# Patient Record
Sex: MALE | Race: WHITE | NOT HISPANIC OR LATINO | Employment: OTHER | URBAN - METROPOLITAN AREA
[De-identification: names, ages, dates, MRNs, and addresses within clinical notes are randomized per-mention and may not be internally consistent; named-entity substitution may affect disease eponyms.]

---

## 2023-03-31 DIAGNOSIS — B00.9 HERPES SIMPLEX: ICD-10-CM

## 2023-03-31 DIAGNOSIS — E78.00 HYPERCHOLESTEREMIA: Primary | ICD-10-CM

## 2023-03-31 RX ORDER — VALACYCLOVIR HYDROCHLORIDE 1 G/1
1000 TABLET, FILM COATED ORAL DAILY
Qty: 90 TABLET | Refills: 3 | Status: SHIPPED | OUTPATIENT
Start: 2023-03-31 | End: 2024-03-20 | Stop reason: SDUPTHER

## 2023-03-31 RX ORDER — VALACYCLOVIR HYDROCHLORIDE 1 G/1
1000 TABLET, FILM COATED ORAL DAILY
COMMUNITY
End: 2023-03-31 | Stop reason: SDUPTHER

## 2023-03-31 RX ORDER — ROSUVASTATIN CALCIUM 5 MG/1
5 TABLET, COATED ORAL NIGHTLY
COMMUNITY
End: 2023-03-31 | Stop reason: SDUPTHER

## 2023-03-31 RX ORDER — ROSUVASTATIN CALCIUM 5 MG/1
5 TABLET, COATED ORAL NIGHTLY
Qty: 90 TABLET | Refills: 3 | Status: SHIPPED | OUTPATIENT
Start: 2023-03-31 | End: 2024-05-07 | Stop reason: SDUPTHER

## 2023-05-05 DIAGNOSIS — K21.9 GASTROESOPHAGEAL REFLUX DISEASE, UNSPECIFIED WHETHER ESOPHAGITIS PRESENT: Primary | ICD-10-CM

## 2023-05-05 RX ORDER — ZINC GLUCONATE 13.3 MG
200 LOZENGE ORAL AS NEEDED
COMMUNITY

## 2023-05-05 RX ORDER — ESOMEPRAZOLE MAGNESIUM 40 MG/1
40 CAPSULE, DELAYED RELEASE ORAL
COMMUNITY
Start: 2023-02-10 | End: 2023-05-05 | Stop reason: SDUPTHER

## 2023-05-05 RX ORDER — ESOMEPRAZOLE MAGNESIUM 40 MG/1
40 CAPSULE, DELAYED RELEASE ORAL
Qty: 90 CAPSULE | Refills: 1 | Status: SHIPPED | OUTPATIENT
Start: 2023-05-05 | End: 2023-11-06 | Stop reason: SDUPTHER

## 2023-05-05 RX ORDER — CHLORHEXIDINE GLUCONATE 4 %
LIQUID (ML) TOPICAL
COMMUNITY

## 2023-06-15 PROBLEM — E78.5 HYPERLIPIDEMIA: Status: ACTIVE | Noted: 2023-06-15

## 2023-06-15 PROBLEM — B00.9 HERPES INFECTION: Status: RESOLVED | Noted: 2023-06-15 | Resolved: 2023-06-15

## 2023-06-15 NOTE — PROGRESS NOTES
Dimitris Schaeffer is a 63 y.o. male who presents Follow-up      HPI: Dimitris is here for follow up and to discuss potentially starting PrEP as he thinks about dating again since his  passed away last March.  He has been doing well for the most part.  He sold his condominium and bought a new house in the [facility].  Since his last visit with me in February he has been taking the Nexium for his chronic mucus production and throat hoarseness which he has had a excellent response to.  His symptoms resolved.  He noticed coming off of the Nexium in between prescriptions and his symptoms after 3 days returned.  He has never had an upper endoscopy.  His last colonoscopy was close to 10 years ago as well.    In addition he would like to discuss starting preexposure prophylaxis for HIV in preparation of starting to date again.      Patient Active Problem List   Diagnosis    Hyperlipidemia    Chronic GERD    Medication management     Past Medical History:   Diagnosis Date    Herpes infection         Past Surgical History:   Procedure Laterality Date    ADENOIDECTOMY      HERNIA REPAIR      Inguinal    TONSILLECTOMY          Social History     Tobacco Use    Smoking status: Never    Smokeless tobacco: Never         Current Outpatient Medications:     cimetidine (Tagamet) 200 mg tablet, Take 1 tablet (200 mg) by mouth if needed., Disp: , Rfl:     esomeprazole (NexIUM) 40 mg DR capsule, Take 1 capsule (40 mg) by mouth once daily in the morning. Take before meals., Disp: 90 capsule, Rfl: 1    glucosamine-chondroitin 500-400 mg tablet, Take 1 tablet by mouth 2 times a day., Disp: , Rfl:     melatonin 12 mg tablet, Take by mouth., Disp: , Rfl:     rosuvastatin (Crestor) 5 mg tablet, Take 1 tablet (5 mg) by mouth once daily at bedtime., Disp: 90 tablet, Rfl: 3    valACYclovir (Valtrex) 1 gram tablet, Take 1 tablet (1,000 mg) by mouth once daily., Disp: 90 tablet, Rfl: 3     No Known Allergies        /79 (BP  Location: Left arm, Patient Position: Sitting)   Pulse 91   Temp 37.6 °C (99.7 °F)   Wt 64 kg (141 lb)   SpO2 96%   BMI 23.11 kg/m²  Body mass index is 23.11 kg/m².     Physical Exam  Constitutional:       Appearance: Normal appearance.   Pulmonary:      Effort: Pulmonary effort is normal.      Breath sounds: Normal breath sounds.   Musculoskeletal:         General: Normal range of motion.   Neurological:      General: No focal deficit present.      Mental Status: He is alert.   Psychiatric:         Mood and Affect: Mood normal.         Judgment: Judgment normal.           Problem List Items Addressed This Visit       Chronic GERD - Primary    Relevant Orders    EGD    Medication management    Relevant Orders    HIV 1/2 Antigen/Antibody Screen with Reflex to Confirmation    RPR    Hepatitis panel, acute    Comprehensive metabolic panel    CBC and Auto Differential    Vitamin B12    Vitamin D 25-Hydroxy,Total    C. trachomatis / N. gonorrhoeae, DNA probe    XR DEXA bone density     Other Visit Diagnoses       Colon cancer screening        Relevant Orders    Colonoscopy             Assessment/Plan   We discussed the pros and cons of preexposure prophylaxis including the risk of bone loss in renal disease.  Patient handout was given to him.  We will get baseline labs and a bone density prior to starting.  For his chronic mucus production and chest discomfort relieved with Nexium but unable to come off we will set him up for an EGD.  He is due to have a screening colonoscopy at the same time.    Edilma Mason MD    TVT of 40 minutes with greater than 50% spent FTF in assessment/discussion and care coordination

## 2023-06-16 ENCOUNTER — LAB (OUTPATIENT)
Dept: LAB | Facility: LAB | Age: 63
End: 2023-06-16
Payer: COMMERCIAL

## 2023-06-16 ENCOUNTER — OFFICE VISIT (OUTPATIENT)
Dept: PRIMARY CARE | Facility: CLINIC | Age: 63
End: 2023-06-16
Payer: COMMERCIAL

## 2023-06-16 VITALS
TEMPERATURE: 99.7 F | SYSTOLIC BLOOD PRESSURE: 129 MMHG | HEART RATE: 91 BPM | WEIGHT: 141 LBS | DIASTOLIC BLOOD PRESSURE: 79 MMHG | OXYGEN SATURATION: 96 % | BODY MASS INDEX: 23.11 KG/M2

## 2023-06-16 DIAGNOSIS — K21.9 CHRONIC GERD: Primary | ICD-10-CM

## 2023-06-16 DIAGNOSIS — Z79.899 MEDICATION MANAGEMENT: ICD-10-CM

## 2023-06-16 DIAGNOSIS — Z12.11 COLON CANCER SCREENING: ICD-10-CM

## 2023-06-16 LAB
ALANINE AMINOTRANSFERASE (SGPT) (U/L) IN SER/PLAS: 28 U/L (ref 10–52)
ALBUMIN (G/DL) IN SER/PLAS: 4.9 G/DL (ref 3.4–5)
ALKALINE PHOSPHATASE (U/L) IN SER/PLAS: 60 U/L (ref 33–136)
ANION GAP IN SER/PLAS: 14 MMOL/L (ref 10–20)
ASPARTATE AMINOTRANSFERASE (SGOT) (U/L) IN SER/PLAS: 20 U/L (ref 9–39)
BASOPHILS (10*3/UL) IN BLOOD BY AUTOMATED COUNT: 0.03 X10E9/L (ref 0–0.1)
BASOPHILS/100 LEUKOCYTES IN BLOOD BY AUTOMATED COUNT: 0.4 % (ref 0–2)
BILIRUBIN TOTAL (MG/DL) IN SER/PLAS: 0.7 MG/DL (ref 0–1.2)
CALCIDIOL (25 OH VITAMIN D3) (NG/ML) IN SER/PLAS: 56 NG/ML
CALCIUM (MG/DL) IN SER/PLAS: 10 MG/DL (ref 8.6–10.6)
CARBON DIOXIDE, TOTAL (MMOL/L) IN SER/PLAS: 26 MMOL/L (ref 21–32)
CHLORIDE (MMOL/L) IN SER/PLAS: 102 MMOL/L (ref 98–107)
COBALAMIN (VITAMIN B12) (PG/ML) IN SER/PLAS: 481 PG/ML (ref 211–911)
CREATININE (MG/DL) IN SER/PLAS: 1.15 MG/DL (ref 0.5–1.3)
EOSINOPHILS (10*3/UL) IN BLOOD BY AUTOMATED COUNT: 0.21 X10E9/L (ref 0–0.7)
EOSINOPHILS/100 LEUKOCYTES IN BLOOD BY AUTOMATED COUNT: 3 % (ref 0–6)
ERYTHROCYTE DISTRIBUTION WIDTH (RATIO) BY AUTOMATED COUNT: 14.1 % (ref 11.5–14.5)
ERYTHROCYTE MEAN CORPUSCULAR HEMOGLOBIN CONCENTRATION (G/DL) BY AUTOMATED: 34 G/DL (ref 32–36)
ERYTHROCYTE MEAN CORPUSCULAR VOLUME (FL) BY AUTOMATED COUNT: 93 FL (ref 80–100)
ERYTHROCYTES (10*6/UL) IN BLOOD BY AUTOMATED COUNT: 5.23 X10E12/L (ref 4.5–5.9)
GFR MALE: 71 ML/MIN/1.73M2
GLUCOSE (MG/DL) IN SER/PLAS: 143 MG/DL (ref 74–99)
HEMATOCRIT (%) IN BLOOD BY AUTOMATED COUNT: 48.5 % (ref 41–52)
HEMOGLOBIN (G/DL) IN BLOOD: 16.5 G/DL (ref 13.5–17.5)
HEPATITIS A VIRUS IGM AB PRESENCE IN SER/PLAS BY IMMUNOASSAY: NONREACTIVE
HEPATITIS B VIRUS CORE IGM AB PRESENCE IN SER/PLAS BY IMMUNOASSY: NONREACTIVE
HEPATITIS B VIRUS SURFACE AG PRESENCE IN SERUM: NONREACTIVE
HEPATITIS C VIRUS AB PRESENCE IN SERUM: NONREACTIVE
HIV 1/ 2 AG/AB SCREEN: NONREACTIVE
IMMATURE GRANULOCYTES/100 LEUKOCYTES IN BLOOD BY AUTOMATED COUNT: 0.1 % (ref 0–0.9)
LEUKOCYTES (10*3/UL) IN BLOOD BY AUTOMATED COUNT: 7 X10E9/L (ref 4.4–11.3)
LYMPHOCYTES (10*3/UL) IN BLOOD BY AUTOMATED COUNT: 1.36 X10E9/L (ref 1.2–4.8)
LYMPHOCYTES/100 LEUKOCYTES IN BLOOD BY AUTOMATED COUNT: 19.5 % (ref 13–44)
MONOCYTES (10*3/UL) IN BLOOD BY AUTOMATED COUNT: 0.6 X10E9/L (ref 0.1–1)
MONOCYTES/100 LEUKOCYTES IN BLOOD BY AUTOMATED COUNT: 8.6 % (ref 2–10)
NEUTROPHILS (10*3/UL) IN BLOOD BY AUTOMATED COUNT: 4.77 X10E9/L (ref 1.2–7.7)
NEUTROPHILS/100 LEUKOCYTES IN BLOOD BY AUTOMATED COUNT: 68.4 % (ref 40–80)
NRBC (PER 100 WBCS) BY AUTOMATED COUNT: 0 /100 WBC (ref 0–0)
PLATELETS (10*3/UL) IN BLOOD AUTOMATED COUNT: 275 X10E9/L (ref 150–450)
POTASSIUM (MMOL/L) IN SER/PLAS: 3.8 MMOL/L (ref 3.5–5.3)
PROTEIN TOTAL: 7.6 G/DL (ref 6.4–8.2)
SODIUM (MMOL/L) IN SER/PLAS: 138 MMOL/L (ref 136–145)
UREA NITROGEN (MG/DL) IN SER/PLAS: 21 MG/DL (ref 6–23)

## 2023-06-16 PROCEDURE — 87591 N.GONORRHOEAE DNA AMP PROB: CPT

## 2023-06-16 PROCEDURE — 1036F TOBACCO NON-USER: CPT | Performed by: INTERNAL MEDICINE

## 2023-06-16 PROCEDURE — 85025 COMPLETE CBC W/AUTO DIFF WBC: CPT

## 2023-06-16 PROCEDURE — 80053 COMPREHEN METABOLIC PANEL: CPT

## 2023-06-16 PROCEDURE — 36415 COLL VENOUS BLD VENIPUNCTURE: CPT

## 2023-06-16 PROCEDURE — 87491 CHLMYD TRACH DNA AMP PROBE: CPT

## 2023-06-16 PROCEDURE — 99214 OFFICE O/P EST MOD 30 MIN: CPT | Performed by: INTERNAL MEDICINE

## 2023-06-16 PROCEDURE — 80074 ACUTE HEPATITIS PANEL: CPT

## 2023-06-16 PROCEDURE — 86592 SYPHILIS TEST NON-TREP QUAL: CPT

## 2023-06-16 PROCEDURE — 87389 HIV-1 AG W/HIV-1&-2 AB AG IA: CPT

## 2023-06-16 PROCEDURE — 82306 VITAMIN D 25 HYDROXY: CPT

## 2023-06-16 PROCEDURE — 82607 VITAMIN B-12: CPT

## 2023-06-16 RX ORDER — GLUCOSAMINE/CHONDRO SU A 500-400 MG
1 TABLET ORAL 2 TIMES DAILY
COMMUNITY

## 2023-06-17 DIAGNOSIS — Z91.89 AT RISK FOR HIV DUE TO HOMOSEXUAL CONTACT: ICD-10-CM

## 2023-06-17 LAB
CHLAMYDIA TRACH., AMPLIFIED: NEGATIVE
N. GONORRHEA, AMPLIFIED: NEGATIVE
RPR MONITORING: NONREACTIVE

## 2023-06-17 RX ORDER — EMTRICITABINE AND TENOFOVIR DISOPROXIL FUMARATE 200; 300 MG/1; MG/1
1 TABLET, FILM COATED ORAL DAILY
Qty: 90 TABLET | Refills: 3 | Status: SHIPPED | OUTPATIENT
Start: 2023-06-17 | End: 2024-06-10 | Stop reason: SDUPTHER

## 2023-07-31 ENCOUNTER — HOSPITAL ENCOUNTER (OUTPATIENT)
Dept: DATA CONVERSION | Facility: HOSPITAL | Age: 63
End: 2023-07-31
Attending: INTERNAL MEDICINE | Admitting: INTERNAL MEDICINE
Payer: COMMERCIAL

## 2023-07-31 DIAGNOSIS — K29.70 GASTRITIS, UNSPECIFIED, WITHOUT BLEEDING: ICD-10-CM

## 2023-07-31 DIAGNOSIS — Z12.11 ENCOUNTER FOR SCREENING FOR MALIGNANT NEOPLASM OF COLON: ICD-10-CM

## 2023-07-31 DIAGNOSIS — R12 HEARTBURN: ICD-10-CM

## 2023-07-31 DIAGNOSIS — K21.9 GASTRO-ESOPHAGEAL REFLUX DISEASE WITHOUT ESOPHAGITIS: ICD-10-CM

## 2023-07-31 DIAGNOSIS — K64.4 RESIDUAL HEMORRHOIDAL SKIN TAGS: ICD-10-CM

## 2023-08-03 LAB
COMPLETE PATHOLOGY REPORT: NORMAL
CONVERTED ADDENDUM DIAGNOSIS 2: NORMAL
CONVERTED CLINICAL DIAGNOSIS-HISTORY: NORMAL
CONVERTED FINAL DIAGNOSIS: NORMAL
CONVERTED FINAL REPORT PDF LINK TO COPY AND PASTE: NORMAL
CONVERTED GROSS DESCRIPTION: NORMAL

## 2023-08-07 ENCOUNTER — TELEPHONE (OUTPATIENT)
Dept: PRIMARY CARE | Facility: CLINIC | Age: 63
End: 2023-08-07
Payer: COMMERCIAL

## 2023-08-07 NOTE — TELEPHONE ENCOUNTER
Pt is calling in regards to colonoscopy and endoscopy and would like to touch base about the results - KIM

## 2023-08-07 NOTE — TELEPHONE ENCOUNTER
Discussed results with Adis.  His prep was fair to poor on the colonoscopy however no specimens found.  Will repeat Colonoscopy in 5 years. His EGD had reactive gastropathy and active gastritis. He is on Nexium 40 mg daily and experiencing some relapse of his symptoms. I advised that her double down on lifestyle changes and to increase his Nexium to bid for the next couple of weeks.  His H. Pylori is still pending. He is planning on travelling to [place] to visit his late partners family and will plan on updating his covid booster prior to travel.

## 2023-11-06 DIAGNOSIS — K21.9 GASTROESOPHAGEAL REFLUX DISEASE, UNSPECIFIED WHETHER ESOPHAGITIS PRESENT: ICD-10-CM

## 2023-11-06 RX ORDER — ESOMEPRAZOLE MAGNESIUM 40 MG/1
40 CAPSULE, DELAYED RELEASE ORAL
Qty: 90 CAPSULE | Refills: 3 | Status: SHIPPED | OUTPATIENT
Start: 2023-11-06

## 2023-12-08 ENCOUNTER — OFFICE VISIT (OUTPATIENT)
Dept: DERMATOLOGY | Facility: CLINIC | Age: 63
End: 2023-12-08
Payer: COMMERCIAL

## 2023-12-08 DIAGNOSIS — L57.0 ACTINIC KERATOSIS: ICD-10-CM

## 2023-12-08 DIAGNOSIS — B35.3 TINEA PEDIS OF RIGHT FOOT: ICD-10-CM

## 2023-12-08 DIAGNOSIS — D48.5 NEOPLASM OF UNCERTAIN BEHAVIOR OF SKIN: ICD-10-CM

## 2023-12-08 DIAGNOSIS — Z12.83 SCREENING EXAM FOR SKIN CANCER: Primary | ICD-10-CM

## 2023-12-08 DIAGNOSIS — L81.4 LENTIGO: ICD-10-CM

## 2023-12-08 DIAGNOSIS — D22.5 MELANOCYTIC NEVUS OF TRUNK: ICD-10-CM

## 2023-12-08 DIAGNOSIS — D18.01 HEMANGIOMA OF SKIN: ICD-10-CM

## 2023-12-08 DIAGNOSIS — D04.61 SQUAMOUS CELL CARCINOMA IN SITU (SCCIS) OF DORSUM OF RIGHT HAND: ICD-10-CM

## 2023-12-08 DIAGNOSIS — L57.8 DIFFUSE PHOTODAMAGE OF SKIN: ICD-10-CM

## 2023-12-08 PROCEDURE — 17004 DESTROY PREMAL LESIONS 15/>: CPT | Performed by: DERMATOLOGY

## 2023-12-08 PROCEDURE — 1036F TOBACCO NON-USER: CPT | Performed by: DERMATOLOGY

## 2023-12-08 PROCEDURE — 99204 OFFICE O/P NEW MOD 45 MIN: CPT | Performed by: DERMATOLOGY

## 2023-12-08 PROCEDURE — 88305 TISSUE EXAM BY PATHOLOGIST: CPT | Mod: TC,DER | Performed by: DERMATOLOGY

## 2023-12-08 PROCEDURE — 88305 TISSUE EXAM BY PATHOLOGIST: CPT | Performed by: DERMATOLOGY

## 2023-12-08 PROCEDURE — 11306 SHAVE SKIN LESION 0.6-1.0 CM: CPT

## 2023-12-08 RX ORDER — KETOCONAZOLE 20 MG/G
CREAM TOPICAL 2 TIMES DAILY
Qty: 60 G | Refills: 11 | Status: SHIPPED | OUTPATIENT
Start: 2023-12-08

## 2023-12-08 ASSESSMENT — DERMATOLOGY PATIENT ASSESSMENT
DO YOU USE A TANNING BED: NO
DO YOU HAVE ANY NEW OR CHANGING LESIONS: NO
DO YOU USE SUNSCREEN: OCCASIONALLY

## 2023-12-08 ASSESSMENT — DERMATOLOGY QUALITY OF LIFE (QOL) ASSESSMENT: ARE THERE EXCLUSIONS OR EXCEPTIONS FOR THE QUALITY OF LIFE ASSESSMENT: NO

## 2023-12-08 ASSESSMENT — ITCH NUMERIC RATING SCALE: HOW SEVERE IS YOUR ITCHING?: 0

## 2023-12-08 NOTE — PROGRESS NOTES
Subjective     Dimitris Schaeffer is a 63 y.o. male who presents for the following: Skin Check.  He denies any changes in any of the brown spots on his skin recently, including in size, shape, or color, and he states they are all asymptomatic with no associated bleeding, itching, or burning.  He notes 2 scaly bumps, 1 on each hand, which have been present for several months and have increased in size and have not resolved.    Review of Systems:  No other skin or systemic complaints other than what is documented elsewhere in the note.    The following portions of the chart were reviewed this encounter and updated as appropriate:       Skin Cancer History  No skin cancer on file.    Specialty Problems    None      Past Dermatologic / Past Relevant Medical History:    No history of atypical nevi or skin cancer    Family History:    No known family history of skin cancer    Social History:    The patient states he is originally from [place], but then worked as a  in the [employer] in [facility] and now lives in [place]; his sister is a patient in our office as well as was his partner, Gustavo, but he recently passed away    Allergies:  Patient has no known allergies.    Current Medications / CAM's:    Current Outpatient Medications:     cimetidine (Tagamet) 200 mg tablet, Take 1 tablet (200 mg) by mouth if needed., Disp: , Rfl:     emtricitabine-tenofovir, TDF, (Truvada) 200-300 mg tablet, Take 1 tablet by mouth once daily., Disp: 90 tablet, Rfl: 3    esomeprazole (NexIUM) 40 mg DR capsule, Take 1 capsule (40 mg) by mouth once daily in the morning. Take before meals., Disp: 90 capsule, Rfl: 3    glucosamine-chondroitin 500-400 mg tablet, Take 1 tablet by mouth 2 times a day., Disp: , Rfl:     melatonin 12 mg tablet, Take by mouth., Disp: , Rfl:     rosuvastatin (Crestor) 5 mg tablet, Take 1 tablet (5 mg) by mouth once daily at bedtime., Disp: 90 tablet, Rfl: 3    valACYclovir (Valtrex) 1 gram tablet,  Take 1 tablet (1,000 mg) by mouth once daily., Disp: 90 tablet, Rfl: 3    ketoconazole (NIZOral) 2 % cream, Apply topically 2 times a day. To affected areas of feet for 3-4 weeks; repeat as needed for flares, Disp: 60 g, Rfl: 11     Objective   Well appearing patient in no apparent distress; mood and affect are within normal limits.    A full examination was performed including scalp, face, eyes, ears, nose, lips, neck, chest, axillae, abdomen, back, bilateral upper extremities, and bilateral lower extremities. All findings within normal limits unless otherwise noted below.    Assessment/Plan   1. Screening exam for skin cancer    Related Procedures  Follow Up In Dermatology - Established Patient    2. Neoplasm of uncertain behavior of skin (2)  Left medial distal dorsal hand  6 mm erythematous, scaly, hyperkeratotic papule          Shave removal    Lesion length (cm):  0.6  Margin per side (cm):  0  Lesion diameter (cm):  0.6  Informed consent: discussed and consent obtained    Timeout: patient name, date of birth, surgical site, and procedure verified    Procedure prep:  Patient was prepped and draped  Anesthesia: the lesion was anesthetized in a standard fashion    Anesthetic:  1% lidocaine w/ epinephrine 1-100,000 local infiltration  Instrument used: DermaBlade and flexible razor blade    Hemostasis achieved with: aluminum chloride    Outcome: patient tolerated procedure well    Post-procedure details: sterile dressing applied and wound care instructions given    Dressing type: bandage and petrolatum      Staff Communication: Dermatology Local Anesthesia: 1 % Lidocaine / Epinephrine - Amount: 0.5 cc    Specimen 1 - Dermatopathology- DERM LAB  Differential Diagnosis: SCCis vs HAK  Check Margins Yes/No?:  yes  Comments:    Dermpath Lab: Routine Histopathology (formalin-fixed tissue)    Right proximal dorsal hand  7 mm erythematous, scaly, hyperkeratotic papule          Shave removal    Lesion length (cm):   0.7  Margin per side (cm):  0  Lesion diameter (cm):  0.7  Informed consent: discussed and consent obtained    Timeout: patient name, date of birth, surgical site, and procedure verified    Procedure prep:  Patient was prepped and draped  Anesthesia: the lesion was anesthetized in a standard fashion    Anesthetic:  1% lidocaine w/ epinephrine 1-100,000 local infiltration  Instrument used: DermaBlade and flexible razor blade    Hemostasis achieved with: aluminum chloride    Outcome: patient tolerated procedure well    Post-procedure details: sterile dressing applied and wound care instructions given    Dressing type: bandage and petrolatum      Staff Communication: Dermatology Local Anesthesia: 1 % Lidocaine / Epinephrine - Amount: 0.5 cc    Specimen 2 - Dermatopathology- DERM LAB  Differential Diagnosis: SCCis vs HAK  Check Margins Yes/No?:  yes  Comments:    Dermpath Lab: Routine Histopathology (formalin-fixed tissue)    3. Actinic keratosis (16)  Forehead and Scalp (16)  Scattered on the patient's face, mainly his forehead, and frontal scalp, there are multiple erythematous, gritty, scaly macules     Actinic Keratoses - scattered on face and frontal scalp.  The pre-cancerous nature of these lesions and treatment options were discussed with the patient today.  At this time, we recommend treatment with liquid nitrogen cryotherapy.  The patient expressed understanding, is in agreement with this plan, and wishes to proceed with cryotherapy today.    Destr of lesion - Forehead and Scalp  Complexity: simple    Destruction method: cryotherapy    Informed consent: discussed and consent obtained    Lesion destroyed using liquid nitrogen: Yes    Cryotherapy cycles:  1  Outcome: patient tolerated procedure well with no complications    Post-procedure details: wound care instructions given      4. Melanocytic nevus of trunk  Scattered on the patient's face, neck, trunk, and extremities, there are multiple small, round- to  oval-shaped, brown-pigmented and pink-colored, symmetric, uniform-appearing macules and dome-shaped papules    Clinically benign- to slightly atypical-appearing nevi - the clinically benign- to slightly atypical-appearing nature of the patient's nevi was discussed with the patient today.  None of the patient's nevi meet threshold for biopsy today.  We emphasized the importance of performing monthly self-skin exams using the ABCDs of monitoring moles, which were reviewed with the patient today and an informational hand-out provided.  We also emphasized the importance of sun avoidance and sun protection with daily sunscreen use.  The patient expressed understanding and is in agreement with this plan.    5. Lentigo  Multiple tan- to light brown-colored, round- to oval-shaped, symmetric and uniform-appearing macules and small patches consistent with lentigines scattered in sun-exposed areas.    Solar Lentigines and photodamage.  The clinically benign-appearing nature of these lesions and their relation to chronic sun exposure were discussed with the patient today and reassurance provided.  None of these lesions meet threshold for biopsy today, and thus no treatment is medically indicated for these lesions at this time.  The signs and symptoms of skin cancer were reviewed and the patient was advised to practice sun protection and sun avoidance, use daily sunscreen, and perform regular self skin exams.  The patient was instructed to monitor these lesions for any changes, such as in size, shape, or color, or associated symptoms and to call our office to schedule a return visit for re-evaluation if any such changes or symptoms are noticed in the future.  The patient expressed understanding and is in agreement with this plan.    6. Hemangioma of skin  Scattered on the patient's face, neck, trunk, and extremities, there are multiple small, round, cherry red- to purplish-colored, symmetric, uniform, vascular-appearing macules  and papules    Cherry Angiomas - the benign nature of these vascular lesions was discussed with the patient today and reassurance provided.  No treatment is medically indicated for these lesions at this time.    7. Tinea pedis of right foot  Bilateral feet  On the patient's bilateral feet, there is moist scaling with maceration and fissures in the lateral webspaces and erythematous, scaly, thin plaques in a moccasin-type distribution on the soles and heels.    Tinea Pedis - bilateral feet.  The fungal nature of this condition and treatment options were discussed extensively with the patient today.  At this time, we recommend topical anti-fungal therapy with Econazole 1% cream, which the patient was instructed to apply twice daily to the affected areas of the feet for the next 3-4 weeks; the patient may repeat treatment in a similar fashion as needed for future flares.  The risks, benefits, and side effects of this medication were discussed.  The patient expressed understanding and is in agreement with this plan.    ketoconazole (NIZOral) 2 % cream - Bilateral feet  Apply topically 2 times a day. To affected areas of feet for 3-4 weeks; repeat as needed for flares    8. Diffuse photodamage of skin  Diffuse photodamage with actinic changes with telangiectasia and mottled pigmentation in sun-exposed areas.    Photodamage.  The signs and symptoms of skin cancer were reviewed and the patient was advised to practice sun protection and sun avoidance, use daily sunscreen, and perform regular self skin exams.  Sun protection was discussed, including avoiding the mid-day sun, wearing a sunscreen with SPF at least 50, and stressing the need for reapplication of sunscreen and applying more than they think they need.      I saw and evaluated the patient. I personally obtained the key and critical portions of the history and physical exam or was physically present for key and critical portions performed by the resident/fellow. I  reviewed the resident/fellow's documentation and discussed the patient with the resident/fellow. I agree with the resident/fellow's medical decision making as documented in the note.    Linden Bey MD

## 2023-12-12 LAB
LABORATORY COMMENT REPORT: NORMAL
PATH REPORT.FINAL DX SPEC: NORMAL
PATH REPORT.GROSS SPEC: NORMAL
PATH REPORT.MICROSCOPIC SPEC OTHER STN: NORMAL
PATH REPORT.RELEVANT HX SPEC: NORMAL
PATH REPORT.TOTAL CANCER: NORMAL

## 2024-02-15 ENCOUNTER — OFFICE VISIT (OUTPATIENT)
Dept: DERMATOLOGY | Facility: CLINIC | Age: 64
End: 2024-02-15
Payer: COMMERCIAL

## 2024-02-15 DIAGNOSIS — L57.0 ACTINIC KERATOSIS: ICD-10-CM

## 2024-02-15 DIAGNOSIS — L82.1 SEBORRHEIC KERATOSIS: ICD-10-CM

## 2024-02-15 DIAGNOSIS — L81.4 LENTIGO: ICD-10-CM

## 2024-02-15 DIAGNOSIS — L57.8 DIFFUSE PHOTODAMAGE OF SKIN: ICD-10-CM

## 2024-02-15 DIAGNOSIS — C44.92 SQUAMOUS CELL CARCINOMA OF SKIN: Primary | ICD-10-CM

## 2024-02-15 PROCEDURE — 99213 OFFICE O/P EST LOW 20 MIN: CPT | Performed by: DERMATOLOGY

## 2024-02-15 PROCEDURE — 1036F TOBACCO NON-USER: CPT | Performed by: DERMATOLOGY

## 2024-02-15 PROCEDURE — 17004 DESTROY PREMAL LESIONS 15/>: CPT | Performed by: DERMATOLOGY

## 2024-02-15 ASSESSMENT — DERMATOLOGY QUALITY OF LIFE (QOL) ASSESSMENT: ARE THERE EXCLUSIONS OR EXCEPTIONS FOR THE QUALITY OF LIFE ASSESSMENT: NO

## 2024-02-15 ASSESSMENT — DERMATOLOGY PATIENT ASSESSMENT
DO YOU USE A TANNING BED: NO
DO YOU USE SUNSCREEN: OCCASIONALLY
DO YOU HAVE ANY NEW OR CHANGING LESIONS: NO
ARE YOU AN ORGAN TRANSPLANT RECIPIENT: NO

## 2024-02-15 ASSESSMENT — ITCH NUMERIC RATING SCALE: HOW SEVERE IS YOUR ITCHING?: 0

## 2024-02-16 NOTE — PROGRESS NOTES
Subjective     Dimitris Schaeffer is a 63 y.o. male who presents for the following: Discuss recent biopsy results and treatment options.  Biopsy of 2 suspicious lesions performed at his initial visit in our office on 12/8/23 revealed an actinic keratosis on his left medial distal dorsal hand and a squamous cell carcinoma in situ on his right proximal dorsal hand.    Today, the patient states the biopsy sites healed well.  Since his last visit, he states he has noticed a red, scaly, rough spot on his left cheek.  He also notes several dry, scaly spots on his hands.  They are asymptomatic with no associated bleeding, itching, or burning.      Review of Systems:  No other skin or systemic complaints other than what is documented elsewhere in the note.    The following portions of the chart were reviewed this encounter and updated as appropriate:       Skin Cancer History  Biopsy Date Type Location Status   12/8/23 SCC in Situ Right proximal dorsal hand Refer Mohs/Surgeon     Specialty Problems    None      Past Dermatologic / Past Relevant Medical History:    - recent biopsy-proven SCC in situ on right proximal dorsal hand diagnosed at initial visit on 12/8/23  - AKs  - no h/o melanoma    Family History:    No known family history of skin cancer    Social History:    The patient states he is originally from [place], but then worked as a  in the [employer] in [facility] and now lives in [place]; his sister is a patient in our office as well as was his partner, Gustavo, but he recently passed away    Allergies:  Patient has no known allergies.    Current Medications / CAM's:    Current Outpatient Medications:     cimetidine (Tagamet) 200 mg tablet, Take 1 tablet (200 mg) by mouth if needed., Disp: , Rfl:     emtricitabine-tenofovir, TDF, (Truvada) 200-300 mg tablet, Take 1 tablet by mouth once daily., Disp: 90 tablet, Rfl: 3    esomeprazole (NexIUM) 40 mg DR capsule, Take 1 capsule (40 mg) by mouth once  daily in the morning. Take before meals., Disp: 90 capsule, Rfl: 3    glucosamine-chondroitin 500-400 mg tablet, Take 1 tablet by mouth 2 times a day., Disp: , Rfl:     ketoconazole (NIZOral) 2 % cream, Apply topically 2 times a day. To affected areas of feet for 3-4 weeks; repeat as needed for flares, Disp: 60 g, Rfl: 11    melatonin 12 mg tablet, Take by mouth., Disp: , Rfl:     rosuvastatin (Crestor) 5 mg tablet, Take 1 tablet (5 mg) by mouth once daily at bedtime., Disp: 90 tablet, Rfl: 3    valACYclovir (Valtrex) 1 gram tablet, Take 1 tablet (1,000 mg) by mouth once daily., Disp: 90 tablet, Rfl: 3     Objective   Well appearing patient in no apparent distress; mood and affect are within normal limits.    A skin examination was performed including: Face, neck, and bilateral hands. All findings within normal limits unless otherwise noted below.    Assessment/Plan   1. Squamous cell carcinoma of skin  Right Hand - Anterior  Pink, well-healed scar at his recent biopsy site    Biopsy-proven squamous cell carcinoma in situ - right proximal dorsal hand; diagnosed at initial visit on 12/8/23 and pending definitive treatment.  The malignant nature of SCC in situ, the need for further definitive treatment of this lesion, and management options were discussed extensively with the patient in the office today.  At this time, I again recommend referral to our Dermatologic surgery unit for definitive treatment of this SCC in situ, likely with Mohs surgery.  The patient expressed understanding, is in agreement with this plan, and states he already scheduled his Mohs surgery to be performed in our office by Dr. Kelyl on 3/15/24.    2. Actinic keratosis (17)  Left Hand - Anterior (17)  On the patient's left medial distal dorsal hand, there is a pink, well-healed scar at the recent biopsy site with a surrounding rim of erythema, grittiness, and scale.  On her left medial cheek and scattered on her bilateral dorsal hands, there are  multiple erythematous, gritty, scaly macules.    Biopsy-proven Actinic Keratosis and additional AKs -left medial distal dorsal hand, present on the deep and peripheral margins, and left medial cheek and scattered on bilateral dorsal hands, respectively.  The pre-cancerous nature of these lesions and treatment options were discussed with the patient today.  At this time, I recommend treatment with liquid nitrogen cryotherapy.  The patient expressed understanding, is in agreement with this plan, and wishes to proceed with cryotherapy today.    Destr of lesion - Left Hand - Anterior  Complexity: simple    Destruction method: cryotherapy    Informed consent: discussed and consent obtained    Lesion destroyed using liquid nitrogen: Yes    Cryotherapy cycles:  1  Outcome: patient tolerated procedure well with no complications    Post-procedure details: wound care instructions given      Related Procedures  Follow Up In Dermatology    3. Seborrheic keratosis  Scattered on the patient's face, neck, and bilateral hands, there are several tan- to light brown-colored, hyperkeratotic, stuck-on appearing papules of varying size and shape    Seborrheic Keratoses - the benign nature of these lesions was discussed with the patient today and reassurance provided.  No treatment is medically indicated for these lesions at this time.    4. Lentigo  Photodistributed  Multiple tan- to light brown-colored, round- to oval-shaped, symmetric and uniform-appearing macules and small patches consistent with lentigines scattered in sun-exposed areas.    Solar Lentigines and photodamage.  The clinically benign-appearing nature of these lesions and their relation to chronic sun exposure were discussed with the patient today and reassurance provided.  None of these lesions meet threshold for biopsy today, and thus no treatment is medically indicated for these lesions at this time.  The signs and symptoms of skin cancer were reviewed and the patient  was advised to practice sun protection and sun avoidance, use daily sunscreen, and perform regular self skin exams.  The patient was instructed to monitor these lesions for any changes, such as in size, shape, or color, or associated symptoms and to call our office to schedule a return visit for re-evaluation if any such changes or symptoms are noticed in the future.  The patient expressed understanding and is in agreement with this plan.    5. Diffuse photodamage of skin  Photodistributed  Diffuse photodamage with actinic changes with telangiectasia and mottled pigmentation in sun-exposed areas.    History of squamous cell carcinoma in situ and actinic keratoses and photodamage.  The signs and symptoms of skin cancer were reviewed and the patient was advised to practice sun protection and sun avoidance, use daily sunscreen, and perform regular self skin exams.  I will have the patient return to my office in 4 to 6 months from the date of his last visit for routine follow-up and skin exam, and the patient was instructed to call our office should the patient notice any new, changing, symptomatic, or otherwise concerning skin lesions before then.  The patient expressed understanding and is in agreement with this plan.

## 2024-03-15 ENCOUNTER — OFFICE VISIT (OUTPATIENT)
Dept: DERMATOLOGY | Facility: CLINIC | Age: 64
End: 2024-03-15
Payer: COMMERCIAL

## 2024-03-15 DIAGNOSIS — C44.622 SQUAMOUS CELL CANCER OF SKIN OF RIGHT HAND: Primary | ICD-10-CM

## 2024-03-15 PROCEDURE — 99214 OFFICE O/P EST MOD 30 MIN: CPT | Performed by: DERMATOLOGY

## 2024-03-15 PROCEDURE — 1036F TOBACCO NON-USER: CPT | Performed by: DERMATOLOGY

## 2024-03-15 PROCEDURE — 17311 MOHS 1 STAGE H/N/HF/G: CPT | Performed by: DERMATOLOGY

## 2024-03-15 NOTE — PROGRESS NOTES
Office Visit Note  Date: 3/15/2024  Surgeon:  Danica Kelly MD  Office Location: [address]  [facility] [address]  Dept: [phone]  Dept Fax: [phone]  Referring Provider: Linden Bey MD  [address] [place] [address]   Darren Christine [place], [facility] [address]  [place] [address]    Kwadwo Schaeffer is a 63 y.o. male who presents for the following: MOHS Surgery    According to the patient, the lesion has been present for approximately 6 months at the time of diagnosis.  The lesion is not causing symptoms.  The lesion has not been treated previously.    The patient does not have a pacemaker / defibrillator.  The patient does not have a heart valve / joint replacement.    The patient is not on blood thinners.  The patient does not have a history of hepatitis B or C.  The patient does not have a history of HIV.  The patient does not have a history of immunosuppression (e.g. organ transplantation, malignancy, medications)    Review of Systems:  No other skin or systemic complaints other than what is documented elsewhere in the note.    MEDICAL HISTORY: clinically relevant history including significant past medical history, medications and allergies was reviewed and documented in Epic.    Objective   Well appearing patient in no apparent distress; mood and affect are within normal limits.  Vital signs: See record.  Noted on the Right Dorsal Hand  Is a 1.0 x 1.0 cm scar    The patient confirmed the identified site.    Discussion:  The nature of the diagnosis was explained. The lesion is a skin cancer.  It has a risk of local growth and distant spread. The condition is associated with sun exposure.  Warning signs of non-melanoma skin cancer discussed. Patient was instructed to perform monthly self skin examination.  We recommended that the patient have regular full skin exams given an increased risk of subsequent skin cancers. The patient was instructed to use sun  protective behaviors including use of broad spectrum sunscreens and sun protective clothing to reduce risk of skin cancers.      Risks, benefits, side effects of Mohs surgery were discussed with patient and the patient voiced understanding.  It was explained that even though the cure rate of Mohs is very high it is not 100%. Risks of surgery including but not limited to bleeding, infection, numbness, nerve damage, and scar were reviewed.  Discussion included wound care requirements, activity restrictions, likely scar outcome and time to heal.     After Mohs surgery, the defect may need to be repaired surgically and the scar may be longer than the original lesion.  Reconstruction options, risks, and benefits were reviewed including second intention healing, linear repair (4-1 ratio was explained), local flaps, skin grafts, cartilage grafts and interpolation flaps (the need for multiple surgeries was explained). Possible outcomes were reviewed including likely scar appearance, failure of flap survival, infection, bleeding and the need for revision surgery.     The pathology was reviewed, the photograph was reviewed, and the referring physician's note was reviewed.    Patient elected for Mohs surgery.

## 2024-03-15 NOTE — PROGRESS NOTES
Mohs Surgery Operative Note    Date of Surgery:  3/15/2024  Surgeon:  Danica Kelly MD  Office Location: [address]   [facility] [address]  [facility] [address]  Dept: [phone]  Dept Fax: [phone]  Referring Provider: Linden Bey MD  [address] Darren Arias, [address] [address]      Assessment/Plan   Pre-procedure:   Obtained informed consent: written from patient  The surgical site was identified and confirmed with the patient.     Intra-operative:   Audible time out called at : 8:25 AM 03/15/24  by: Ciarra Mason LPN   Verified patient name, birthdate, site, specimen bottle label & requisition.    The planned procedure(s) was again reviewed with the patient. The risks of bleeding, infection, nerve damage and scarring were reviewed. Written authorization was obtained. The patient identity, surgical site, and planned procedure(s) were verified. The provider acted as both surgeon and pathologist.     Squamous cell carcinoma of skin  Right Dorsal Hand  Mohs surgery  Consent obtained: written    Universal Protocol:  Procedure explained and questions answered to patient or proxy's satisfaction: Yes    Test results available and properly labeled: Yes    Pathology report reviewed: Yes    External notes reviewed: Yes    Photo or diagram used for site identification: Yes    Site/side marked: Yes    Slide independently reviewed by Mohs surgeon: Yes    Immediately prior to procedure a time out was called: Yes    Patient identity confirmed: verbally with patient  Preparation: Patient was prepped and draped in usual sterile fashion      Anticoagulation:  Is the patient taking prescription anticoagulant and/or aspirin prescribed/recommended by a physician? No    Was the anticoagulation regimen changed prior to Mohs? No      Anesthesia:  Anesthesia method: local infiltration  Local anesthetic: lidocaine 2% WITH epi    Procedure Details:  Biopsy accession  number: [id number]  Date of biopsy: 12/8/2023  Pre-Op diagnosis: squamous cell carcinoma  SCC subtype: in situ  Surgery side: right  Surgical site (from skin exam): Right Dorsal Hand  Pre-operative length (cm): 1  Pre-operative width (cm): 1  Indications for Mohs surgery: ill-defined borders  Previously treated? No      Micrographic Surgery Details:  Post-operative length (cm): 1  Post-operative width (cm): 1  Number of Mohs stages: 1    Stage 1     Comments: The patient was brought into the operating room and placed in the procedure chair in the appropriate position.  The area positive by previous biopsy was identified and confirmed with the patient. The area of clinically obvious tumor was debulked using a curette and/or scalpel as needed. An incision was made following the Mohs approach through the skin. The specimen was taken to the lab, divided into 1 piece(s) and appropriately chromacoded and processed.  Tumor features identified on Mohs section: no tumor identified  Depth of defect: subcutaneous fat    Patient tolerance of procedure: tolerated well, no immediate complications    Reconstruction:  Was the defect reconstructed?: No    Hemostasis achieved with: electrodesiccation  Outcome: patient tolerated procedure well with no complications    Post-procedure details: sterile dressing applied and wound care instructions given    Dressing type: Gelfoam, Telfa pad, pressure dressing and bandage    Additional details:  Repair: After a discussion with the patient regarding the options for wound closure, a decision was made to proceed with second intention healing.  Dressing F/U: Surgifoam was placed in the wound. A pressure dressing was placed to help stabilize the wound and to minimize the risk of postoperative bleeding. Wound care was discussed, and the patient was given written post-operative wound care instructions.             Wound care was discussed, and the patient was given written post-operative wound care  instructions.      The patient will follow up with Danica Kelly MD as needed for any post operative problems or concerns, and will follow up with their primary dermatologist as scheduled.

## 2024-03-20 DIAGNOSIS — B00.9 HERPES SIMPLEX: ICD-10-CM

## 2024-03-20 RX ORDER — VALACYCLOVIR HYDROCHLORIDE 1 G/1
1000 TABLET, FILM COATED ORAL DAILY
Qty: 90 TABLET | Refills: 1 | Status: SHIPPED | OUTPATIENT
Start: 2024-03-20

## 2024-04-08 ENCOUNTER — HOSPITAL ENCOUNTER (OUTPATIENT)
Dept: RADIOLOGY | Facility: HOSPITAL | Age: 64
Discharge: HOME | End: 2024-04-08
Payer: COMMERCIAL

## 2024-04-08 ENCOUNTER — OFFICE VISIT (OUTPATIENT)
Dept: ORTHOPEDIC SURGERY | Facility: HOSPITAL | Age: 64
End: 2024-04-08
Payer: COMMERCIAL

## 2024-04-08 VITALS — WEIGHT: 148 LBS | BODY MASS INDEX: 25.27 KG/M2 | HEIGHT: 64 IN

## 2024-04-08 DIAGNOSIS — M25.561 ACUTE PAIN OF RIGHT KNEE: ICD-10-CM

## 2024-04-08 DIAGNOSIS — M25.561 RIGHT KNEE PAIN, UNSPECIFIED CHRONICITY: ICD-10-CM

## 2024-04-08 DIAGNOSIS — M76.899 PES ANSERINUS TENDINITIS AND BURSITIS: Primary | ICD-10-CM

## 2024-04-08 PROCEDURE — E0114 CRUTCH UNDERARM PAIR NO WOOD: HCPCS | Performed by: PHYSICIAN ASSISTANT

## 2024-04-08 PROCEDURE — 73564 X-RAY EXAM KNEE 4 OR MORE: CPT | Mod: RIGHT SIDE | Performed by: RADIOLOGY

## 2024-04-08 PROCEDURE — 99214 OFFICE O/P EST MOD 30 MIN: CPT | Performed by: PHYSICIAN ASSISTANT

## 2024-04-08 PROCEDURE — 73564 X-RAY EXAM KNEE 4 OR MORE: CPT | Mod: RT

## 2024-04-08 PROCEDURE — 99204 OFFICE O/P NEW MOD 45 MIN: CPT | Performed by: PHYSICIAN ASSISTANT

## 2024-04-08 ASSESSMENT — PAIN - FUNCTIONAL ASSESSMENT: PAIN_FUNCTIONAL_ASSESSMENT: 0-10

## 2024-04-08 ASSESSMENT — PAIN DESCRIPTION - DESCRIPTORS: DESCRIPTORS: SHARP;DULL

## 2024-04-08 ASSESSMENT — PAIN SCALES - GENERAL: PAINLEVEL_OUTOF10: 5 - MODERATE PAIN

## 2024-04-08 NOTE — PATIENT INSTRUCTIONS
Use crutches to avoid limping; walk heel to toe    1. Follow stretching exercises that were on a separate handout   2. Hold each stretch for a least 1 minute  3. Do not bounce while stretching  4. Stretch for 10 minutes at a time, 3x a day for 6 weeks then daily  5. Remember, it takes several weeks to a few months of consistent stretching to increase flexibility and decrease symptoms.     You can use OTC Voltaren gel or aspercream and apply it to the injured area.    Ice and elevate supported at the calf with no pressure on the heel to reduce swelling.    The patient was given a prescription for physical therapy.  Physical therapy is medically necessary to improve strength, balance, range of motion and functional outcomes after injury and/or surgery.    Follow up as needed

## 2024-04-22 ENCOUNTER — TELEPHONE (OUTPATIENT)
Dept: PRIMARY CARE | Facility: CLINIC | Age: 64
End: 2024-04-22
Payer: COMMERCIAL

## 2024-04-22 DIAGNOSIS — M25.562 LEFT KNEE PAIN, UNSPECIFIED CHRONICITY: ICD-10-CM

## 2024-04-22 NOTE — TELEPHONE ENCOUNTER
Good morning Iza,  Although my knee is somewhat better, and I'm supposed to begin PT this week, I think I want to have it looked at by an Orthopedic doc. Can Dr. Mason provide a referral? I'd prefer someone at [facility].    Thanks-  Willy Schaeffer    --   Dimitris Schaeffer  [place], [place] & MD [place]        Please sign order

## 2024-04-24 ENCOUNTER — EVALUATION (OUTPATIENT)
Dept: PHYSICAL THERAPY | Facility: CLINIC | Age: 64
End: 2024-04-24
Payer: COMMERCIAL

## 2024-04-24 ENCOUNTER — APPOINTMENT (OUTPATIENT)
Dept: ORTHOPEDIC SURGERY | Facility: HOSPITAL | Age: 64
End: 2024-04-24
Payer: COMMERCIAL

## 2024-04-24 DIAGNOSIS — M76.899 PES ANSERINUS TENDINITIS AND BURSITIS: Primary | ICD-10-CM

## 2024-04-24 PROBLEM — M70.51 PES ANSERINUS BURSITIS OF RIGHT KNEE: Status: ACTIVE | Noted: 2024-04-24

## 2024-04-24 PROCEDURE — 97110 THERAPEUTIC EXERCISES: CPT | Mod: GP

## 2024-04-24 PROCEDURE — 97161 PT EVAL LOW COMPLEX 20 MIN: CPT | Mod: GP

## 2024-04-24 ASSESSMENT — ENCOUNTER SYMPTOMS
DEPRESSION: 0
OCCASIONAL FEELINGS OF UNSTEADINESS: 0
LOSS OF SENSATION IN FEET: 0

## 2024-04-24 ASSESSMENT — PAIN SCALES - GENERAL: PAINLEVEL_OUTOF10: 3

## 2024-04-24 ASSESSMENT — PAIN - FUNCTIONAL ASSESSMENT: PAIN_FUNCTIONAL_ASSESSMENT: 0-10

## 2024-04-24 NOTE — PROGRESS NOTES
"  Physical Therapy  Physical Therapy Orthopedic Evaluation    Patient Name: Dimitris Schaeffer  MRN: [id number]  Today's Date: 4/24/2024  Time Calculation  Start Time: 1015  Stop Time: 1100  Time Calculation (min): 45  PT Evaluation Time Entry  PT Evaluation (Low) Time Entry: 25  PT Therapeutic Procedures Time Entry  Therapeutic Exercise Time Entry: 15  Manual therapy: 5                   Insurance:  Visit number: 1 of 75  Authorization info: No auth  Insurance Type: Payor: ANTHEM / Plan: ANTHEM HMP / Product Type: *No Product type* /      Current Problem  1. Pes anserinus tendinitis and bursitis  Referral to Physical Therapy          Referred by:   Nam Ragsdale PA-C    General:  General  Reason for Referral: R knee pain  Referred By: Nam Ragsdale PA-C    Precautions:  Precautions  Precautions Comment: GERD  Medical History Form: Reviewed (scanned into chart)    Subjective:   Subjective   Chief Complaint: patient presents w/ c/o R knee pain. Reports he has had right knee pain on and off for years. He was ascending a flight of stairs on 4/7/2024 when it \"locked up\" on him and he required assistance to come back down the stairs. Since then the pain has significantly improved.   Onset: 4/7/24  SOURAV: has been on and off for years, recently flared up while ascending stairs on Sunday.     Current Condition: Better    Pain:  Pain Assessment: 0-10  Pain Score: 3 (10/10 at worst)  Location: R medial knee   Description: stiffness  Aggravating Factors:  Standing, Walking, and Stairs  Relieving Factors:  Rest and Heat    Relevant Information (PMH & Previous Tests/Imaging): plain films, GERD  Previous Interventions/Treatments: None    Prior Level of Function (PLOF)  Patient previously independent with all ADLs  Exercise/Physical Activity: walking  Work/School: Retired    Patients Living Environment: Reviewed and no concern  Primary Language: English  Patient's Goal(s) for Therapy: Reduce pain and stiffness    Red Flags: Do you " have any of the following? No  Fever/chills, unexplained weight changes, dizziness/fainting, unexplained change in bowel or bladder functions, unexplained malaise or muscle weakness, night pain/sweats, numbness or tingling    Objective:    ROM    Hip AROM (Degrees)      (R)  (L)  Flexion: wfl  wfl   Extension: wfl  wfl     Abduction: wfl  wfl     Adduction: wfl  wfl     ER:  wfl  wfl     IR:  wfl  wfl     Patient guards during hip ranging but has full motion     Knee AROM (Degrees)      (R)  (L)  Flexion: 130  130      Extension: 0  0         Knee PROM (Degrees)      (R)  (L)  Flexion: 135  135      Extension: +2  +2           Strength Testing    Hip    (R)  (L)  Flexion: 5/5  5/5      Extension: 4/5  4/5  Extension: 3+/5  4-/5  (Knee flexed)     Abduction: 4/5  4/5            Knee    (R)  (L)  Flexion: 4/5*  4/5      Extension: 5/5  5/5     *RROM flexion produces familiar pain      Functional Screening  Squat:   Lunge:   SL Balance:   Lateral Step Down:   SL Quarter Squat:     Palpation:   TTP over pes anserine insertion, tenderness improved w/ gentle CF massage.      Flexibility:   Elys: Pos B R more than L due to hips lifting off table  Jaden: Pos R due to knee flexion w/ hip flexion  Obers: Neg B      Patella Mobility: WFL B      Orthotics: None      Gait: No obvious deficits walking in and out of clinic.      Special Tests  Ligament Tests:   Lachman Test: Neg B   Pivot Shift Test:    Posterior Drawer Test:    Valgus Stress Test:    Varus Stress Test:   Meniscus   Joint line tenderness: Neg    Patella   Apprehension Test: Neg    Outcome Measures:  Other Measures  Lower Extremity Funtional Score (LEFS): 48/80     Treatments:    There-ex: 15'  Hamstring set in supine w/ 6 ' holds 3x10*  Prone quad stretch 2x30' holds*  Jaden stretch 4x20' holds w/ strap assist *  Knee flexion isometric 4x30' holds w/ varying angles*    Patient reported a reduction in familiar symptoms post isometrics     * = added to HEP.  Sheet  with exercise descriptions and images issued.  Skilled intervention utilized in the appropriate selection & application of above exercises.  Verbal and tactile cues provided for proper form and technique.  Pt. demonstrated appropriate form & verbalized understanding of optimal technique for above exercises.    Manual: 5'  Gentle CF to pes anserine insertion    [email or web address]    Access Code: [email or web address]    EDUCATION: Home exercise program, plan of care, activity modifications, pain management, and injury pathology       Assessment: Patient presents with signs and symptoms consistent with pes anserine tendinopathy, resulting in limited participation in pain-free ADLs and inability to perform at their prior level of function. Pt would benefit from physical therapy to address the impairments found & listed previously in the objective section in order to return to safe and pain-free ADLs and prior level of function.       Plan:  PT Plan: Skilled PT  PT Frequency: Other (Comment) (every other week)  Duration: 4 weeks  Onset Date: 04/24/24  Rehab Potential: Good (due to current clinical presentation and within session changes)  Planned Interventions include: therapeutic exercise, self-care home management, manual therapy, therapeutic activities, gait training, neuromuscular coordination, vasopneumatic, dry needling, aquatic therapy  Frequency: Every Other Week  Duration: 4 Weeks  Goals: Set and discussed today  Active       R Pes anserine tendinitis       STGs       Start:  04/24/24    Expected End:  05/22/24       1. Patient will demonstrate independence w/ HEP to allow for self-management of symptoms  2. Patient will demonstrate increased strength during hip extension MMT to at least 4/5 B MMT to improve ADL performance and progress towards LTG.  3. Patient will demonstrate improved flexibility by having a -Elys and -Jaden tests to reduce strain on posterior chain.  4. Patient will report a decrease in  pain by at least 2 points to meet the MCID.   5. Patient will perform stair negotation w/ a reported decrease in symptoms to progress towards long term goal   6. Patient will demonstrate improved knee flexion strength during MMT to 4+/5 to progress towards LTG         LTGs       Start:  04/24/24    Expected End:  06/19/24       1. Patient will demonstrate increased knee flexion strength during MMT to 5/5 + pain free to improve ADL performance  2. Patient will be able to reciprocally negotiate stairs w/o a reproduction of familiar symptoms to improve ADL performance   3. Patient will report an increase on LEFS by at least 9 points to meet the MDC  4. Patient will demonstrate improved strength during hip extension MMT to 5/5 B to improve ADL performance  5. Patient will tolerate resisted knee flexion w/o a return of familiar symptoms to demonstrate an improved activity tolerance                  Plan of care was developed with input and agreement by the patient      Julius Yin, PT, ATC

## 2024-04-24 NOTE — PROGRESS NOTES
" Cuca Toro MD   Adult Reconstruction and Joint Replacement Surgery  Phone: [phone]     Fax: [phone]     INITIAL CONSULTATION    Name: Dimitris Schaeffer  : 1960  Date of Visit:  2024    CC: Right knee pain    Clinical History:  Dimitris Schaeffer is a 63 y.o. male who presents with {Numbers; 1-10:02627} {weeks, months, years:16385} of RIGHT knee pain. They were referred by Dr. Mason PCP.    Patient has tried the following {treatments tried:29539}. Date of last steroid injection: ***. Patient {DOES/DOES NOT:49591} have pain at night. Patient is able to walk {Blocks:05983}. Patient is currently using {assistive device:34790} as assistive device. Primarily complains of {pain location:44658} pain. Patient has difficulty with {activity limitations:50325}. The pain is significantly impacting his ability to perform activities of daily living. Patient reports no longer able to do activities such as ***.     Focused History  PMH: {FOCUSEDHX:42685::\"Reviewed\",\"PE/DVT: ***\"}  PSH: {FOCUSEDSHX:68231::\"Reviewed \",\"Hip/Knee replacement: ***\",\"Hip/Knee surgery: ***\",\"Anesthesia complications: ***\",\"Spine surgery: ***\",\"Surgical infection: ***\",\"Weight loss surgery: ***\"}  Meds: {MEDSHX:14609::\"Reviewed\",\"Current Anticoagulants: ***\",\"Weight loss medication: ***\",\"Current Opioids: ***\"}  Allergies: {MEDSHX:57473::\"Reviewed \",\"The patient reports no contraindications or allergies to cephalosporins, aspirin, NSAIDs or opioids, except as noted above.\"}  FH: ***No family history of any bleeding or clotting disorders.  SHx: {SOCHX:18659::\"Reviewed\",\"Occupation: ***\",\"Current smoker: ***\",\"EtOH intake weekly: ***\",\"Social support: ***\",\"Preferred physical activities: ***\"}  Mosque: no***    PROMs   None     Past Medical History:   Diagnosis Date    Herpes infection      Documented in chart and reviewed.     Past Surgical History:   Procedure Laterality Date    ADENOIDECTOMY      HERNIA REPAIR      " Inguinal    TONSILLECTOMY         Allergies: He has No Known Allergies.     Medications:  Current Outpatient Medications   Medication Instructions    cimetidine (TAGAMET) 200 mg, oral, As needed    emtricitabine-tenofovir, TDF, (Truvada) 200-300 mg tablet 1 tablet, oral, Daily    esomeprazole (NEXIUM) 40 mg, oral, Daily before breakfast    glucosamine-chondroitin 500-400 mg tablet 1 tablet, oral, 2 times daily    ketoconazole (NIZOral) 2 % cream Topical, 2 times daily, To affected areas of feet for 3-4 weeks; repeat as needed for flares    melatonin 12 mg tablet oral    rosuvastatin (CRESTOR) 5 mg, oral, Nightly    valACYclovir (VALTREX) 1,000 mg, oral, Daily       No family history on file.  Documented in chart and reviewed.     Social History     Tobacco Use    Smoking status: Never    Smokeless tobacco: Never   Substance Use Topics    Alcohol use: Yes     Alcohol/week: 7.0 standard drinks of alcohol     Types: 7 Glasses of wine per week       Review of Systems: Review of systems completed with medical assistant intake. Please refer to this note.     Falls: The patient ***denies any recent falls or fall-related injuries.    Physical Exam:  BMI: There is no height or weight on file to calculate BMI., which is ***abnormal. ***Encouraged to lose weight and to follow up with PCP.    Constitutional: The patient is well-appearing and well groomed.     Neurological/Psychiatric: The patient is alert and oriented to person, place and time. The patient has a normal mood and affect.    Skin Examination: ***The skin over the right lower extremity, left lower extremity, right upper extremity, and left upper extremity is intact without any evidence of infection or rash.    Cardiovascular Examination: There are no varicosities*** and the skin is normal temperature, capillary refill normal, arterial pulses normal,*** no edema.    Lymphatic Examination: There is no lymphatic swelling*** or palpable lymph nodes present around the  "involved joint.    Neurological Examination: Bilateral lower extremities are grossly neurologically intact.*** Sensation normal***, motor function normal***.    Gait: The patient ambulates with an antalgic*** gait.     Lumbar spine:    No tenderness to palpation midline.    ***Negative straight leg raise bilaterally.    Right Hip Examination:  The skin is intact over the hip.    There is no tenderness over the greater trochanter.    Range of motion is full extension to 100 degrees of flexion.    The hip is stable without subluxation or dislocation.    The hip internally rotates to 15 degrees and externally rotates to 45 degrees.    There is no pain with hip motion.    Left Hip Examination:  The skin is intact over the hip.    There is no tenderness over the greater trochanter.    Range of motion is full extension to 100 degrees of flexion.    The hip is stable without subluxation or dislocation.    The hip internally rotates to 15 degrees and externally rotates to 45 degrees.    There is no pain with hip motion.    Right Knee Examination:  Examination of the knee reveals the skin to be intact.    There is {Blank single:12417::\"no\",\"a small\",\"a large\",\"a moderate\"} effusion in the knee.    The alignment of the knee is {Blank single:05303::\"Valgus\",\"neutral\",\"Varus\"}.    This deformity {Blank single:91311::\"is not\",\"is\"} correctable.    There is tenderness to palpation over the joint line.    There is significant quadriceps atrophy.    Range of Motion: {rom3:82283::\"20\",\"15\",\"10\",\"5\",\"full extension\"} to {rom4:31162::\"less than 90\",\"90\",\"100\",\"110\",\"120\"} degrees of flexion.    The knee is stable to varus-valgus stress and anterior-posterior stress.     There is {no/mild/moderate/severe:19664} grinding with range of motion.    There is {no/mild/moderate/severe:19664} patellofemoral crepitus.    Left Knee Examination:  Examination of the knee reveals the skin to be intact.     There is no obvious swelling.    There is " "a no effusion in the knee.     The alignment of the knee is normal.    There is no tenderness to palpation over the joint line.    There is no significant quadriceps atrophy.    Range of motion is full extension to 120 degrees of flexion.    The knee is stable to varus-valgus stress and anterior-posterior stress.     There is {no/mild/moderate/severe:19664} grinding with range of motion.    There is {no/mild/moderate/severe:19664} patellofemoral crepitus.    Prior Labs:   Lab Results   Component Value Date    WBC 7.0 06/16/2023    HGB 16.5 06/16/2023    HCT 48.5 06/16/2023    MCV 93 06/16/2023     06/16/2023      No results found for: \"INR\", \"PROTIME\"      Lab Results   Component Value Date    GLUCOSE 143 (H) 06/16/2023    CALCIUM 10.0 06/16/2023     06/16/2023    K 3.8 06/16/2023    CO2 26 06/16/2023     06/16/2023    BUN 21 06/16/2023    CREATININE 1.15 06/16/2023      No results found for: \"CKTOTAL\", \"CKMB\", \"CKMBINDEX\", \"TROPONINI\"   No results found for: \"HGBA1C\"      No results found for: \"CRP\"   No results found for: \"SEDRATE\"     Radiographs:  Radiographs were personally reviewed today. There is evidence of mild  RIGHT  knee osteoarthritis without bone on bone apposition.    Impression:  63 y.o. male presents with mild  RIGHT  knee osteoarthritis without bone on bone apposition. Patient has tried and failed appropriate conservative measures and now has limitation in ADL's.     Diagnosis:  No diagnosis found.    Recommendations / Plan:    I have discussed the options in detail with the patient. We have discussed anti-inflammatory medication, activity modification, physical therapy, corticosteroid injections, viscosupplementation injections, ***partial knee replacement surgery and total knee replacement surgery.    ***The risks and benefits of all these treatment options have been discussed in detail. The patient has tried at least 3 months of the above conservative treatments and continues " to have disabling pain, impaired activities of daily living and worsened quality of life.  Reviewed the surgical optimization steps to optimize their chances for a successful joint replacement surgery.  Currently their BMI is ***.  They would need to lose significant weight before being scheduled for surgery. Discussed that obesity is a risk factor for continued progression of osteoarthritis. Each pound of weight loss offloads their hip and knee joints by 3-6 pounds.  The most effective of these options is weight loss mainly through restricting caloric intake.  A referral to a nutritionist was offered***. A referral to bariatric surgery was offered***. Encouraged them to maintain range of motion and strength around the knee joints.  They will continue to implement these strategies in addressing their pain.       ***Recommend the patient continue optimizing nonsurgical treatment interventions as outlined above for management of their knee arthritis.  I would be happy to see them again at any point to discuss surgery if they are more optimized.  The patient verbalizes understanding with the recommendations and treatment plan as outlined above and is in agreement.  Questions were addressed.    Large Joint Injection 48267: Knee  Consent given by: Patient  Timeout: Immediately prior to procedure the correct patient, procedure, and site was verified. Sterile gloves and semi-sterile technique were used.   Indications: Knee pain and joint swelling.   Location: {LEFT RIGHT BILATERAL:40417} knee  Needle size: 22 G  Approach: Lateral    Medications administered: please see medical assistant note for lot number and expiration date.   Subcutaneous   4 ml of 1% lidocaine     Deep   4 ml of 1% lidocaine   4 mL 0.5% marcaine   2 mL of 40 mg kenalog     Aspirate amount: ***  Aspirate Appearance: ***   ***Analysis: Fluid sent for laboratory analysis, including cell count, differential, culture, crystal, AFB    Patient tolerance:  Dressing applied. Patient tolerated the procedure well with no immediate complications.    _____________  Cuca Toro MD   [facility]     Approximately ***45 minutes were spent on the following tasks:              Preparing for the patient              Reviewing medical records              Taking a patient history              Performing a physical exam              Reviewing treatment options with the patient              Explaining the risks, potential benefits, and alternative to surgery  Explaining the expected rehabilitation after each treatment option  Explaining the potential long term expectations  Evaluating the diagnostic imaging     This office note was transcribed with dictation software.  Please excuse any typographical errors, program misunderstandings leading to inadvertent insertions or deletions of inappropriate wording, pronoun errors and other unintentional transcription errors not noticed on proof-reading.

## 2024-05-02 ENCOUNTER — APPOINTMENT (OUTPATIENT)
Dept: DERMATOLOGY | Facility: CLINIC | Age: 64
End: 2024-05-02
Payer: COMMERCIAL

## 2024-05-03 ENCOUNTER — TREATMENT (OUTPATIENT)
Dept: PHYSICAL THERAPY | Facility: CLINIC | Age: 64
End: 2024-05-03
Payer: COMMERCIAL

## 2024-05-03 DIAGNOSIS — M76.899 PES ANSERINUS TENDINITIS AND BURSITIS: Primary | ICD-10-CM

## 2024-05-03 PROCEDURE — 97110 THERAPEUTIC EXERCISES: CPT | Mod: GP

## 2024-05-03 ASSESSMENT — PAIN SCALES - GENERAL: PAINLEVEL_OUTOF10: 5 - MODERATE PAIN

## 2024-05-03 ASSESSMENT — ENCOUNTER SYMPTOMS
LOSS OF SENSATION IN FEET: 0
DEPRESSION: 0
OCCASIONAL FEELINGS OF UNSTEADINESS: 0

## 2024-05-03 ASSESSMENT — PAIN - FUNCTIONAL ASSESSMENT: PAIN_FUNCTIONAL_ASSESSMENT: 0-10

## 2024-05-03 NOTE — PROGRESS NOTES
Physical Therapy  Physical Therapy Treatment    Patient Name: Dimitris Schaeffer  MRN: [id number]  Today's Date: 5/3/2024  Time Calculation  Start Time: 0850  Stop Time: 0930  Time Calculation (min): 40  PT Therapeutic Procedures Time Entry  Therapeutic Exercise Time Entry: 40  Manual therapy: 0                  Insurance:  Visit number: 2 of 75  Authorization info: No auth  Insurance Type: Payor: ANTHEM / Plan: ANTHEM HMP / Product Type: *No Product type* /      Current Problem  1. Pes anserinus tendinitis and bursitis              Referred by:   Nam Ragsdale PA-C    General:  General  Reason for Referral: R knee pain  Referred By: Nam Ragsdale PA-C    Precautions:  Precautions  Precautions Comment: GERD  Medical History Form: Reviewed (scanned into chart)    Subjective:   Subjective   Patient reports he feels he is getting much better the past few days. Feels much more comfortable being active and moving. Ever since he started PT he feels he needs less NSAIDS and can see the end of this pain.     Pain:  Pain Assessment: 0-10  Pain Score: 5 - Moderate pain      Objective:    ROM    Hip AROM (Degrees)      (R)  (L)  Flexion: wfl  wfl   Extension: wfl  wfl     Abduction: wfl  wfl     Adduction: wfl  wfl     ER:  wfl  wfl     IR:  wfl  wfl     Patient guards during hip ranging but has full motion     Knee AROM (Degrees)      (R)  (L)  Flexion: 130  130      Extension: 0  0         Knee PROM (Degrees)      (R)  (L)  Flexion: 135  135      Extension: +2  +2           Strength Testing    Hip    (R)  (L)  Flexion: 5/5  5/5      Extension: 4/5  4/5  Extension: 3+/5  4-/5  (Knee flexed)     Abduction: 4/5  4/5            Knee    (R)  (L)  Flexion: 4/5*  4/5      Extension: 5/5  5/5     *RROM flexion produces familiar pain      Functional Screening  Squat:   Lunge:   SL Balance:   Lateral Step Down:   SL Quarter Squat:     Palpation:   TTP over pes anserine insertion, tenderness improved w/ gentle CF  massage.      Flexibility:   Elys: Pos B R more than L due to hips lifting off table  Jaden: Pos R due to knee flexion w/ hip flexion  Obers: Neg B      Patella Mobility: WFL B      Orthotics: None      Gait: No obvious deficits walking in and out of clinic.      Special Tests  Ligament Tests:   Lachman Test: Neg B   Pivot Shift Test:    Posterior Drawer Test:    Valgus Stress Test:    Varus Stress Test:   Meniscus   Joint line tenderness: Neg    Patella   Apprehension Test: Neg    Outcome Measures:        Treatments:    There-ex: 40'  Upright bike level 3 x 7'  Seated DL leg curls w/ 24 lbs x15, 36lbs 2x12  Glut bridges on heels 3x10*  S/l clamshell w/ green TB 2x12 per side*  Standing hamstring stretch on step w/ 6' holds x10*      * = added to HEP.  Sheet with exercise descriptions and images issued.  Skilled intervention utilized in the appropriate selection & application of above exercises.  Verbal and tactile cues provided for proper form and technique.  Pt. demonstrated appropriate form & verbalized understanding of optimal technique for above exercises.    Manual: 0'      [facility]iv[facility]    Access Code: [email or web address]    EDUCATION: Home exercise program, plan of care, activity modifications, pain management, and injury pathology       Assessment:   Patient tolerated today's session w/ no issues. Demonstrates improvements in tolerance to knee flexion activities through performance of isotonic exercises w/o a return of familiar pain. Patient is doing well overall will benefit from ongoing PT services to progress knee flexion and hip extension activities to load his hamstring tendons to allow for increased activity to reach established goals to return to PLOF.          Plan:  Assess response to increased hamstring loading. Progress strengthening as tolerated.     Goals: Set and discussed today  Active       R Pes anserine tendinitis       STGs       Start:  04/24/24    Expected End:  05/22/24        1. Patient will demonstrate independence w/ HEP to allow for self-management of symptoms  2. Patient will demonstrate increased strength during hip extension MMT to at least 4/5 B MMT to improve ADL performance and progress towards LTG.  3. Patient will demonstrate improved flexibility by having a -Elys and -Jaden tests to reduce strain on posterior chain.  4. Patient will report a decrease in pain by at least 2 points to meet the MCID.   5. Patient will perform stair negotation w/ a reported decrease in symptoms to progress towards long term goal   6. Patient will demonstrate improved knee flexion strength during MMT to 4+/5 to progress towards LTG         LTGs       Start:  04/24/24    Expected End:  06/19/24       1. Patient will demonstrate increased knee flexion strength during MMT to 5/5 + pain free to improve ADL performance  2. Patient will be able to reciprocally negotiate stairs w/o a reproduction of familiar symptoms to improve ADL performance   3. Patient will report an increase on LEFS by at least 9 points to meet the MDC  4. Patient will demonstrate improved strength during hip extension MMT to 5/5 B to improve ADL performance  5. Patient will tolerate resisted knee flexion w/o a return of familiar symptoms to demonstrate an improved activity tolerance                  Plan of care was developed with input and agreement by the patient      Julius Yin, PT, ATC

## 2024-05-07 DIAGNOSIS — E78.00 HYPERCHOLESTEREMIA: ICD-10-CM

## 2024-05-07 RX ORDER — ROSUVASTATIN CALCIUM 5 MG/1
5 TABLET, COATED ORAL NIGHTLY
Qty: 90 TABLET | Refills: 3 | Status: SHIPPED | OUTPATIENT
Start: 2024-05-07 | End: 2025-05-07

## 2024-05-08 NOTE — PROGRESS NOTES
NPV-   History of Present Illness  63 y.o.male presents at same day walk in clinic for right knee pain  1. Pes anserinus tendinitis and bursitis  Crutches    Referral to Physical Therapy      2. Acute pain of right knee  XR knee right 4+ views      Mechanism of injury: climbing stairs  Date of Injury/Pain: last night  Location of pain: anteromedial knee  Frequency of Pain: worse with walking or bending  Associated symptoms? Swelling.  Previous treatment?   Heat pack, RICE  They deny any locking of the knee    27 point review of systems negative except what is stated in HPI     Constitutional Exam: patient's height and weight reviewed, well-kempt  Psychiatric Exam: alert and oriented x 3, appropriate mood and behavior  Eye Exam: MARIFER, EOMI  Pulmonary Exam: breathing non-labored, no apparent distress  Lymphatic exam: no appreciable lymphadenopathy in the lower extremities  Cardiovascular exam: DP pulses 2+ bilaterally, PT 2+ bilaterally, toes are pink with good capillary refill, no pitting edema  Skin exam: no open lesions, rashes, abrasions or ulcerations  Neurological exam: sensation to light touch intact in both lower extremities in peripheral and dermatomal distributions (except for any abnormalities noted in musculoskeletal exam)      On examination of the right knee:  Normal gait, neutral alignment  Minimal swelling; No effusion bruising or atrophy.  Neutral alignment.    Normal range of motion in extension and flexion.   Normal strength in flexion and extension.  No extensor lag.    Tenderness to palpation: pes anserine bursa  No tenderness to palpation over the medial or lateral joint line, tibial plateau, femoral condyles, quadriceps tendon, patellar tendon, patella, MCL or LCL.    Neurovascularly intact.  Normal sensation to light touch.  Popliteal, dorsalis pedis and posterior tibial pulses 2+ bilaterally.    Negative Fe's test.   Negative Apley's test.   Negative anterior drawer test.   Negative  posterior drawer test.    Negative Lachman's.   Negative valgus stress test at 0 and 30° flexion.   Negative varus stress test at 0 and 30° flexion.   1-1 medial/lateral in 30 degrees flexion, 2-1 medial/lateral at  0 degrees with patellar glide test.   Positive patellar grind test.     I personally reviewed the patient's x-ray images and reports of the right. The xrays show no fractures or dislocation.  Mild degenerative changes      ASSESSMENT: right knee pes anserine bursitis    PLAN: Treatment options were discussed with the patient. The patient was given a prescription for physical therapy.  Physical therapy is medically necessary to improve strength, balance, range of motion and functional outcomes after injury and/or surgery. Patient should avoid deep flexion of the knee including kneeling, squatting or sitting in low chairs.  They should also avoid impact activities such as running and jumping but can use a stationary bike, pool exercises and upper body training. Patient was given a handout and instructed on an at home stretching program.  They should do these exercises 3 times per day for 6 weeks and then daily. Patient can use OTC aspercream for pain and continue to ice and elevate supported at the calf to reduce swelling. All the patient's questions were answered. The patient agrees with the above plan.  Follow up as needed

## 2024-05-22 ENCOUNTER — TREATMENT (OUTPATIENT)
Dept: PHYSICAL THERAPY | Facility: CLINIC | Age: 64
End: 2024-05-22
Payer: COMMERCIAL

## 2024-05-22 DIAGNOSIS — M76.899 PES ANSERINUS TENDINITIS AND BURSITIS: Primary | ICD-10-CM

## 2024-05-22 PROCEDURE — 97110 THERAPEUTIC EXERCISES: CPT | Mod: GP

## 2024-05-22 PROCEDURE — 97140 MANUAL THERAPY 1/> REGIONS: CPT | Mod: GP

## 2024-05-22 ASSESSMENT — PAIN SCALES - GENERAL: PAINLEVEL_OUTOF10: 4

## 2024-05-22 ASSESSMENT — PAIN - FUNCTIONAL ASSESSMENT: PAIN_FUNCTIONAL_ASSESSMENT: 0-10

## 2024-05-22 NOTE — PROGRESS NOTES
Physical Therapy  Physical Therapy Treatment    Patient Name: Dimitris Schaeffer  MRN: [id number]  Today's Date: 5/22/2024  Time Calculation  Start Time: 1100  Stop Time: 1145  Time Calculation (min): 45  PT Therapeutic Procedures Time Entry  Therapeutic Exercise Time Entry: 30  Manual therapy: 15                  Insurance:  Visit number: 4 of 75  Authorization info: No auth  Insurance Type: Payor: ANTHEM / Plan: ANTHEM HMP / Product Type: *No Product type* /      Current Problem  1. Pes anserinus tendinitis and bursitis  Follow Up In Physical Therapy          Referred by:   Nam Ragsdale PA-C    General:  General  Reason for Referral: R knee pain  Referred By: Nam Ragsdale PA-C    Precautions:  Precautions  Precautions Comment: GERD  Medical History Form: Reviewed (scanned into chart)    Subjective:   Subjective   Patient reports he has been doing ok the past few days. His trip went well and he was able to tolerate the drives w/o an increase in pain. Feels his progress has plateau'd as he is no longer improving but is not getting worse.     Pain:  Pain Assessment: 0-10  Pain Score: 4      Objective:    ROM    Hip AROM (Degrees)      (R)  (L)  Flexion: wfl  wfl   Extension: wfl  wfl     Abduction: wfl  wfl     Adduction: wfl  wfl     ER:  wfl  wfl     IR:  wfl  wfl     Patient guards during hip ranging but has full motion     Knee AROM (Degrees)      (R)  (L)  Flexion: 130  130      Extension: 0  0         Knee PROM (Degrees)      (R)  (L)  Flexion: 135  135      Extension: +2  +2           Strength Testing    Hip    (R)  (L)  Flexion: 5/5  5/5      Extension: 4/5  4/5  Extension: 3+/5  4-/5  (Knee flexed)     Abduction: 4/5  4/5            Knee    (R)  (L)  Flexion: 4/5*  4/5      Extension: 5/5  5/5     *RROM flexion produces familiar pain      Functional Screening  Squat:   Lunge:   SL Balance:   Lateral Step Down:   SL Quarter Squat:     Palpation:   TTP over pes anserine insertion, tenderness improved w/  gentle CF massage.      Flexibility:   Elys: Pos B R more than L due to hips lifting off table  Jaden: Pos R due to knee flexion w/ hip flexion  Obers: Neg B      Patella Mobility: WFL B      Orthotics: None      Gait: No obvious deficits walking in and out of clinic.      Special Tests  Ligament Tests:   Lachman Test: Neg B   Pivot Shift Test:    Posterior Drawer Test:    Valgus Stress Test:    Varus Stress Test:   Meniscus   Joint line tenderness: Neg    Patella   Apprehension Test: Neg    Outcome Measures:        Treatments:    There-ex: 30  Prone hamstring curls w/ 5# ankle weights* 3x12  Kickstand RDLs 3x6  Heel slides w/ iso hold x10   Seated leg curl w/ 24# 3x12  Seated band hamstring curl w/ green TB x10      * = added to HEP.  Sheet with exercise descriptions and images issued.  Skilled intervention utilized in the appropriate selection & application of above exercises.  Verbal and tactile cues provided for proper form and technique.  Pt. demonstrated appropriate form & verbalized understanding of optimal technique for above exercises.    Manual: 15  STM pes anserine insertion and distal hamstring soft tissue    [facility].[employer]    Access Code: [email or web address]    EDUCATION: Home exercise program, plan of care, activity modifications, pain management, and injury pathology       Assessment:   Patient tolerated today's session w/ a reported reduction in familiar pain following hamstring strengthening exercises. Demonstrates improvements in tolerance to hamstring strengthening through progression of isometric exercises to isotonics w/ a reported reduction of familiar pain. Patient had difficulty w/ heel slides due to difficulty isolating his hamstrings from his calf muscles. Patient will benefit from ongoing PT services to progress strengthening and tendon loading as tolerated to reach established goals to return to PLOF.           Plan:  Assess response to increased hamstring loading. Progress  strengthening as tolerated.     Goals: Set and discussed today  Active       R Pes anserine tendinitis       STGs (Progressing)       Start:  04/24/24    Expected End:  05/22/24       1. Patient will demonstrate independence w/ HEP to allow for self-management of symptoms  2. Patient will demonstrate increased strength during hip extension MMT to at least 4/5 B MMT to improve ADL performance and progress towards LTG.  3. Patient will demonstrate improved flexibility by having a -Elys and -Jaden tests to reduce strain on posterior chain.  4. Patient will report a decrease in pain by at least 2 points to meet the MCID.   5. Patient will perform stair negotation w/ a reported decrease in symptoms to progress towards long term goal   6. Patient will demonstrate improved knee flexion strength during MMT to 4+/5 to progress towards LTG         LTGs       Start:  04/24/24    Expected End:  06/19/24       1. Patient will demonstrate increased knee flexion strength during MMT to 5/5 + pain free to improve ADL performance  2. Patient will be able to reciprocally negotiate stairs w/o a reproduction of familiar symptoms to improve ADL performance   3. Patient will report an increase on LEFS by at least 9 points to meet the MDC  4. Patient will demonstrate improved strength during hip extension MMT to 5/5 B to improve ADL performance  5. Patient will tolerate resisted knee flexion w/o a return of familiar symptoms to demonstrate an improved activity tolerance                  Plan of care was developed with input and agreement by the patient      Julius Yin, PT, ATC

## 2024-06-10 DIAGNOSIS — Z91.89 AT RISK FOR HIV DUE TO HOMOSEXUAL CONTACT: ICD-10-CM

## 2024-06-10 RX ORDER — EMTRICITABINE AND TENOFOVIR DISOPROXIL FUMARATE 200; 300 MG/1; MG/1
1 TABLET, FILM COATED ORAL DAILY
Qty: 90 TABLET | Refills: 3 | Status: SHIPPED | OUTPATIENT
Start: 2024-06-10 | End: 2025-06-10

## 2024-06-11 ENCOUNTER — TREATMENT (OUTPATIENT)
Dept: PHYSICAL THERAPY | Facility: CLINIC | Age: 64
End: 2024-06-11
Payer: COMMERCIAL

## 2024-06-11 DIAGNOSIS — M76.899 PES ANSERINUS TENDINITIS AND BURSITIS: ICD-10-CM

## 2024-06-11 PROCEDURE — 97110 THERAPEUTIC EXERCISES: CPT | Mod: GP

## 2024-06-11 PROCEDURE — 97140 MANUAL THERAPY 1/> REGIONS: CPT | Mod: GP

## 2024-06-11 ASSESSMENT — PAIN SCALES - GENERAL: PAINLEVEL_OUTOF10: 4

## 2024-06-11 ASSESSMENT — PAIN - FUNCTIONAL ASSESSMENT: PAIN_FUNCTIONAL_ASSESSMENT: 0-10

## 2024-06-11 NOTE — PROGRESS NOTES
Physical Therapy  Physical Therapy Treatment    Patient Name: Dimitris Schaeffer  MRN: [id number]  Today's Date: 6/11/2024  Time Calculation  Start Time: 1400  Stop Time: 1442  Time Calculation (min): 42 min    PT Therapeutic Procedures Time Entry  Manual Therapy Time Entry: 25  Therapeutic Exercise Time Entry: 17          Insurance:  Visit number: 5 of 75  Authorization info: No auth  Insurance Type: Payor: ANTHEM / Plan: ANTHEM HMP / Product Type: *No Product type* /      Current Problem  1. Pes anserinus tendinitis and bursitis  Follow Up In Physical Therapy            Referred by:   Nam Ragsdale PA-C    General:  General  Reason for Referral: R knee pain  Referred By: Nam Ragsdale PA-C    Precautions:  Precautions  Precautions Comment: GERD  Medical History Form: Reviewed (scanned into chart)    Subjective:   Subjective   Patient reports he is doing ok today. He is feeling a little frustrated because the pain is still lingering. Feels he has reached a plateau since his last visit. Went a few days w/o performing the exercises and did not feel any difference in the days that he did perform them.    Pain:  Pain Assessment: 0-10  Pain Score: 4      Objective:    ROM    Hip AROM (Degrees)      (R)  (L)  Flexion: wfl  wfl   Extension: wfl  wfl     Abduction: wfl  wfl     Adduction: wfl  wfl     ER:  wfl  wfl     IR:  wfl  wfl     Patient guards during hip ranging but has full motion     Knee AROM (Degrees)      (R)  (L)  Flexion: 130  130      Extension: 0  0         Knee PROM (Degrees)      (R)  (L)  Flexion: 135  135      Extension: +2  +2           Strength Testing     Hip    (R)  (L)  Flexion: 5/5  5/5      Extension: 4/5  4/5  Extension: 3+/5  4-/5  (Knee flexed)     Abduction: 4/5  4/5            Knee    (R)  (L)  Flexion: 4/5*  4/5      Extension: 5/5  5/5     *RROM flexion produces familiar pain      Functional Screening  Squat:   Lunge:   SL Balance:   Lateral Step Down:   SL Quarter Squat:     Palpation:    TTP over pes anserine insertion, tenderness improved w/ gentle CF massage.      Flexibility:   Elys: Pos B R more than L due to hips lifting off table  Jaden: Pos R due to knee flexion w/ hip flexion  Obers: Neg B      Patella Mobility: WFL B      Orthotics: None      Gait: No obvious deficits walking in and out of clinic.      Special Tests  Ligament Tests:   Lachman Test: Neg B   Pivot Shift Test:    Posterior Drawer Test:    Valgus Stress Test:    Varus Stress Test:   Meniscus   Joint line tenderness: Neg    Patella   Apprehension Test: Neg    Outcome Measures:        Treatments:    There-ex: 17  Hip adductor ball iso hold glut bridges 3x10*  S/l hip adduction x10  SLR 2x10*  Lateral lunges x10*  Reviewed HEP and adjusted based on response.      * = added to HEP.  Sheet with exercise descriptions and images issued.  Skilled intervention utilized in the appropriate selection & application of above exercises.  Verbal and tactile cues provided for proper form and technique.  Pt. demonstrated appropriate form & verbalized understanding of optimal technique for above exercises.    Manual: 25  Verbal and written education provided to patient this date regarding TrP dry needling and associated risks and benefits. Patient verbalizes understanding of associated risks and benefits with TrP dry needling, provides verbal consent to proceed, and denies questions at this time. Dry needling utilized this date to address ongoing pain and dysfunction in pes anserine soft tissue. Soft tissue assessment completed via manual palpation with active TrPs, muscular hypertonicity, and pain noted throughout.     Treatment: 30x40 mm needle x3 inserted into pes anserine insertion and left in situ/performed with pistoning/performed with needle rotation to patient tolerance.     30x40 mm needle inserted into saphenous nerve homeostatic point x2 and left in situ/performed with pistoning/performed with needle rotation to patient tolerance.      Needle site clear and without adverse reaction immediately post needle removal. Patient reports reduction in pain and tightness immediately post treatment with improvements in muscular tone, flexibility, and ROM noted as well. Patient advised to call PT if excessive soreness, bruising, or adverse event is noted post needling. Patient verbalizes understanding and denies questions at this time.      STM pes anserine insertion and distal hamstring soft tissue    [facility].[employer]    Access Code: [email or web address]    EDUCATION: Home exercise program, plan of care, activity modifications, pain management, and injury pathology       Assessment:   Patient tolerated today's session w/ a reported reduction in familiar symptoms at the conclusion of the session. Patient has made progress in ROM, strength and activity tolerance since the time of his initial evaluation. He will benefit from continued PT services to continue tendon loading and strength progression as tolerated to reach established goals and return to PLOF.        Plan:  Assess response to addition of adductor strengthening to HEP. Progress tendon loading as tolerated.    Goals: Set and discussed today  Active       R Pes anserine tendinitis       STGs (Progressing)       Start:  04/24/24    Expected End:  05/22/24       1. Patient will demonstrate independence w/ HEP to allow for self-management of symptoms  2. Patient will demonstrate increased strength during hip extension MMT to at least 4/5 B MMT to improve ADL performance and progress towards LTG.  3. Patient will demonstrate improved flexibility by having a -Elys and -Jaden tests to reduce strain on posterior chain.  4. Patient will report a decrease in pain by at least 2 points to meet the MCID.   5. Patient will perform stair negotation w/ a reported decrease in symptoms to progress towards long term goal   6. Patient will demonstrate improved knee flexion strength during MMT to 4+/5 to  progress towards LTG         LTGs       Start:  04/24/24    Expected End:  06/19/24       1. Patient will demonstrate increased knee flexion strength during MMT to 5/5 + pain free to improve ADL performance  2. Patient will be able to reciprocally negotiate stairs w/o a reproduction of familiar symptoms to improve ADL performance   3. Patient will report an increase on LEFS by at least 9 points to meet the MDC  4. Patient will demonstrate improved strength during hip extension MMT to 5/5 B to improve ADL performance  5. Patient will tolerate resisted knee flexion w/o a return of familiar symptoms to demonstrate an improved activity tolerance                  Plan of care was developed with input and agreement by the patient      Julius Yin, NADINE

## 2024-07-01 ENCOUNTER — TREATMENT (OUTPATIENT)
Dept: PHYSICAL THERAPY | Facility: CLINIC | Age: 64
End: 2024-07-01
Payer: COMMERCIAL

## 2024-07-01 DIAGNOSIS — M76.899 PES ANSERINUS TENDINITIS AND BURSITIS: Primary | ICD-10-CM

## 2024-07-01 PROCEDURE — 97140 MANUAL THERAPY 1/> REGIONS: CPT | Mod: GP

## 2024-07-01 PROCEDURE — 97110 THERAPEUTIC EXERCISES: CPT | Mod: GP

## 2024-07-01 ASSESSMENT — PAIN - FUNCTIONAL ASSESSMENT: PAIN_FUNCTIONAL_ASSESSMENT: 0-10

## 2024-07-01 ASSESSMENT — PAIN SCALES - GENERAL: PAINLEVEL_OUTOF10: 4

## 2024-07-01 NOTE — PROGRESS NOTES
Physical Therapy  Physical Therapy Treatment    Patient Name: Dimitris Schaeffer  MRN: [id number]  Today's Date: 7/1/2024  Time Calculation  Start Time: 1015  Stop Time: 1100  Time Calculation (min): 45 min    PT Therapeutic Procedures Time Entry  Manual Therapy Time Entry: 30  Therapeutic Exercise Time Entry: 15          Insurance:  Visit number: 6 of 75  Authorization info: No auth  Insurance Type: Payor: ANTHEM / Plan: ANTHEM HMP / Product Type: *No Product type* /      Current Problem  1. Pes anserinus tendinitis and bursitis  Follow Up In Physical Therapy            Referred by:   Nam Ragsdale PA-C    General:  General  Reason for Referral: R knee pain  Referred By: Nam Ragsdale PA-C    Precautions:  Precautions  Precautions Comment: GERD  Medical History Form: Reviewed (scanned into chart)    Subjective:   Patient reports he has been doing fairly well since his last visit. He has not been as diligent in keeping up w/ his new adductor exercises. Feels his progress has plateau'd. He is able to perform all of his needed activities, does not feel limited due to his pain, just annoyed. Felt he had significant relief from DN last session for 5-6 days.      Pain:  Pain Assessment: 0-10  0-10 (Numeric) Pain Score: 4 reduced post manual treatment.      Objective:    ROM    Hip AROM (Degrees)      (R)  (L)  Flexion: wfl  wfl   Extension: wfl  wfl     Abduction: wfl  wfl     Adduction: wfl  wfl     ER:  wfl  wfl     IR:  wfl  wfl     Patient guards during hip ranging but has full motion     Knee AROM (Degrees)      (R)  (L)  Flexion: 130  130      Extension: 0  0         Knee PROM (Degrees)      (R)  (L)  Flexion: 135  135      Extension: +2  +2           Strength Testing updated 7/1    Hip    (R)  (L)  Flexion: 5/5  5/5      Extension: 4+/5*  4/5  Extension: 4+/5  4-/5  (Knee flexed)     Abduction: 4/5  4/5            Knee    (R)  (L)  Flexion: 5/5  4/5      Extension: 5/5  5/5     *RROM flexion produces familiar  "pain      Functional Screening  Squat:   Lunge:   SL Balance:   Lateral Step Down:   SL Quarter Squat:     Palpation:   TTP over pes anserine insertion, tenderness improved w/ gentle CF massage.      Flexibility:   Elys: Pos B R more than L due to hips lifting off table  Jaden: Pos R due to knee flexion w/ hip flexion  Obers: Neg B      Patella Mobility: WFL B      Orthotics: None      Gait: No obvious deficits walking in and out of clinic.      Special Tests  Ligament Tests:   Lachman Test: Neg B   Pivot Shift Test:    Posterior Drawer Test:    Valgus Stress Test:    Varus Stress Test:   Meniscus   Joint line tenderness: Neg    Patella   Apprehension Test: Neg    Outcome Measures:  Other Measures  Lower Extremity Funtional Score (LEFS): 55/80     Treatments:    There-ex: 15'  Seated hip IR w/ adductor ball isometric 3x12  Heel slides to bridge 2x10  Stand hamstring curl iso holds w/ 5# 3x45\"  Reviewed HEP and dicussed plan going forward      Manual: 30'  Verbal and written education provided to patient this date regarding TrP dry needling and associated risks and benefits. Patient verbalizes understanding of associated risks and benefits with TrP dry needling, provides verbal consent to proceed, and denies questions at this time. Dry needling utilized this date to address ongoing pain and dysfunction in pes anserine soft tissue. Soft tissue assessment completed via manual palpation with active TrPs, muscular hypertonicity, and pain noted throughout.     Treatment: 30x40 mm needle x4 inserted into pes anserine insertion and left in situ/performed with pistoning/performed with needle rotation to patient tolerance.     30x40 mm needle inserted into saphenous nerve homeostatic point x3 and left in situ/performed with pistoning/performed with needle rotation to patient tolerance.     Needle site clear and without adverse reaction immediately post needle removal. Patient reports reduction in pain and tightness " immediately post treatment with improvements in muscular tone, flexibility, and ROM noted as well. Patient advised to call PT if excessive soreness, bruising, or adverse event is noted post needling. Patient verbalizes understanding and denies questions at this time.      STM pes anserine insertion  STM distal adductors, distal medial hamstrings    [facility]    Access Code: [email or web address]    EDUCATION: Home exercise program, plan of care, activity modifications, pain management, and injury pathology       Assessment:   Patient tolerated today's session w/ a reported reduction in familiar pain post manual therapy. Demonstrates improvements in hip extension strength, knee flexion strength, and tolerance to loading compared to his initial evaluation. Patient has made progress towards all of his established goals. Progress has plateau's the past few weeks, holding future PT based on response to taking a break over the next 4 weeks.       Plan:  Hold future visits for now. Discharge in 4 weeks if patient does not reach out to office for future visits.    Goals: Set and discussed today  Active       R Pes anserine tendinitis       STGs (Progressing)       Start:  04/24/24    Expected End:  07/29/24       1. Patient will demonstrate independence w/ HEP to allow for self-management of symptoms  2. Patient will demonstrate increased strength during hip extension MMT to at least 4/5 B MMT to improve ADL performance and progress towards LTG.  3. Patient will demonstrate improved flexibility by having a -Elys and -Jaden tests to reduce strain on posterior chain.  4. Patient will report a decrease in pain by at least 2 points to meet the MCID.   5. Patient will perform stair negotation w/ a reported decrease in symptoms to progress towards long term goal   6. Patient will demonstrate improved knee flexion strength during MMT to 4+/5 to progress towards LTG         LTGs (Progressing)       Start:  04/24/24     Expected End:  07/29/24       1. Patient will demonstrate increased knee flexion strength during MMT to 5/5 + pain free to improve ADL performance  2. Patient will be able to reciprocally negotiate stairs w/o a reproduction of familiar symptoms to improve ADL performance   3. Patient will report an increase on LEFS by at least 9 points to meet the MDC  4. Patient will demonstrate improved strength during hip extension MMT to 5/5 B to improve ADL performance  5. Patient will tolerate resisted knee flexion w/o a return of familiar symptoms to demonstrate an improved activity tolerance                  Plan of care was developed with input and agreement by the patient      Julius Yin PT

## 2024-07-15 ENCOUNTER — APPOINTMENT (OUTPATIENT)
Dept: DERMATOLOGY | Facility: CLINIC | Age: 64
End: 2024-07-15
Payer: COMMERCIAL

## 2024-07-15 DIAGNOSIS — D22.5 MELANOCYTIC NEVUS OF TRUNK: ICD-10-CM

## 2024-07-15 DIAGNOSIS — L82.1 SEBORRHEIC KERATOSIS: ICD-10-CM

## 2024-07-15 DIAGNOSIS — L57.0 ACTINIC KERATOSIS: Primary | ICD-10-CM

## 2024-07-15 DIAGNOSIS — D18.01 HEMANGIOMA OF SKIN: ICD-10-CM

## 2024-07-15 DIAGNOSIS — Z85.828 HISTORY OF NONMELANOMA SKIN CANCER: ICD-10-CM

## 2024-07-15 DIAGNOSIS — L21.9 SEBORRHEIC DERMATITIS: ICD-10-CM

## 2024-07-15 DIAGNOSIS — L57.8 DIFFUSE PHOTODAMAGE OF SKIN: ICD-10-CM

## 2024-07-15 PROCEDURE — 17004 DESTROY PREMAL LESIONS 15/>: CPT | Performed by: DERMATOLOGY

## 2024-07-15 PROCEDURE — 99214 OFFICE O/P EST MOD 30 MIN: CPT | Performed by: DERMATOLOGY

## 2024-07-15 ASSESSMENT — DERMATOLOGY PATIENT ASSESSMENT
ARE YOU AN ORGAN TRANSPLANT RECIPIENT: NO
DO YOU HAVE ANY NEW OR CHANGING LESIONS: NO
DO YOU USE A TANNING BED: NO

## 2024-07-15 ASSESSMENT — DERMATOLOGY QUALITY OF LIFE (QOL) ASSESSMENT: ARE THERE EXCLUSIONS OR EXCEPTIONS FOR THE QUALITY OF LIFE ASSESSMENT: NO

## 2024-07-15 ASSESSMENT — ITCH NUMERIC RATING SCALE: HOW SEVERE IS YOUR ITCHING?: 0

## 2024-07-16 DIAGNOSIS — Z00.00 HEALTHCARE MAINTENANCE: ICD-10-CM

## 2024-07-16 NOTE — PROGRESS NOTES
Subjective     Dimitris Schaeffer is a 64 y.o. male who presents for the following: Skin Check.  He notes several dry, scaly patches on his face.  They have not changed in any way, including in size, shape, or color, and they are asymptomatic with no associated bleeding, itching, or pain.  He denies any other new, changing, or concerning skin lesions since his last visit; no bleeding, itching, or burning lesions.      Review of Systems:  No other skin or systemic complaints other than what is documented elsewhere in the note.    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 Kelly on 3/15/24  - AKs  - no h/o melanoma    Family History:    No known family history of skin cancer    Social History:    The patient states he is originally from [place], but then worked as a  in the [employer] in [facility] and now lives in [place]; his sister is a patient in our office as well as was his partner, Gustavo, but he recently passed away    Allergies:  Patient has no known allergies.    Current Medications / CAM's:    Current Outpatient Medications:     cimetidine (Tagamet) 200 mg tablet, Take 1 tablet (200 mg) by mouth if needed., Disp: , Rfl:     emtricitabine-tenofovir, TDF, (Truvada) 200-300 mg tablet, Take 1 tablet by mouth once daily., Disp: 90 tablet, Rfl: 3    esomeprazole (NexIUM) 40 mg DR capsule, Take 1 capsule (40 mg) by mouth once daily in the morning. Take before meals., Disp: 90 capsule, Rfl: 3    glucosamine-chondroitin 500-400 mg tablet, Take 1 tablet by mouth 2 times a day., Disp: , Rfl:     ketoconazole  (NIZOral) 2 % cream, Apply topically 2 times a day. To affected areas of feet for 3-4 weeks; repeat as needed for flares, Disp: 60 g, Rfl: 11    melatonin 12 mg tablet, Take by mouth., Disp: , Rfl:     rosuvastatin (Crestor) 5 mg tablet, Take 1 tablet (5 mg) by mouth once daily at bedtime., Disp: 90 tablet, Rfl: 3    valACYclovir (Valtrex) 1 gram tablet, Take 1 tablet (1,000 mg) by mouth once daily., Disp: 90 tablet, Rfl: 1     Objective   Well appearing patient in no apparent distress; mood and affect are within normal limits.    A waist-up examination was performed including scalp, face, eyes, ears, nose, lips, neck, chest, axillae, abdomen, back, and bilateral upper extremities. All findings within normal limits unless otherwise noted below.        Assessment/Plan   1. Actinic keratosis (18)  Head - Anterior (Face) (18)  Scattered on the patient's face, scalp, and bilateral dorsal hands, there are multiple erythematous, gritty, scaly macules     Actinic Keratoses -scattered on face, scalp, and bilateral dorsal hands.  The pre-cancerous nature of these lesions and treatment options were discussed with the patient today.  At this time, I recommend treatment with liquid nitrogen cryotherapy.  The patient expressed understanding, is in agreement with this plan, and wishes to proceed with cryotherapy today.    Destr of lesion - Head - Anterior (Face) (18)  Complexity: simple    Destruction method: cryotherapy    Informed consent: discussed and consent obtained    Lesion destroyed using liquid nitrogen: Yes    Cryotherapy cycles:  1  Outcome: patient tolerated procedure well with no complications    Post-procedure details: wound care instructions given      2. Seborrheic dermatitis  Head - Anterior (Face)  On the patient's face, mainly the glabella and bilateral eyebrows and perinasal creases, there are pink, scaly patches with whitish-yellowish, greasy scale    Seborrheic Dermatitis - flare on face.  The potentially  chronic and intermittently flaring nature of this condition and treatment options were discussed extensively with the patient today.  At this time, I recommend topical anti-fungal therapy with Ketoconazole 2% cream, which the patient was instructed to apply twice daily to the affected areas of the face.  The risks, benefits, and side effects of this medication were discussed.  The patient expressed understanding and is in agreement with this plan.    3. Melanocytic nevus of trunk  Scattered on the patient's face, neck, trunk, and bilateral upper extremities, there are several small, round- to oval-shaped, brown-pigmented and pink-colored, symmetric, uniform-appearing macules and dome-shaped papules    Clinically benign- to slightly atypical-appearing nevi - the clinically benign- to slightly atypical-appearing nature of the patient's nevi was discussed with the patient today.  None of the patient's nevi meet threshold for biopsy today.  I emphasized the importance of performing monthly self-skin exams using the ABCDs of monitoring moles, which were reviewed with the patient today and an informational hand-out provided.  I also emphasized the importance of sun avoidance and sun protection with daily sunscreen use.  The patient expressed understanding and is in agreement with this plan.    4. Seborrheic keratosis  Scattered on the patient's face, neck, trunk, and bilateral upper extremities, there are several tan- to light brown-colored, hyperkeratotic, stuck-on appearing papules of varying size and shape    Seborrheic Keratoses - the benign nature of these lesions was discussed with the patient today and reassurance provided.  No treatment is medically indicated for these lesions at this time.    5. Hemangioma of skin  Scattered on the patient's face, neck, trunk, and extremities, there are multiple small, round, cherry red- to purplish-colored, symmetric, uniform, vascular-appearing macules and papules    Cherry  Angiomas - the benign nature of these vascular lesions was discussed with the patient today and reassurance provided.  No treatment is medically indicated for these lesions at this time.    6. History of nonmelanoma skin cancer  On the patient's right proximal dorsal hand, there is a well-healed scar with no evidence of recurrent growth on exam today.    History of squamous cell carcinoma in situ and actinic keratoses and photodamage.  There is no evidence of recurrence at the site of the patient's previously treated SCC in situ on his right proximal dorsal hand on exam today.  The signs and symptoms of skin cancer were reviewed and the patient was advised to practice sun protection and sun avoidance, use daily sunscreen, and perform regular self skin exams.  I will have the patient return to our office in 4 to 6 months for routine follow-up and skin exam, and the patient was instructed to call our office should the patient notice any new, changing, symptomatic, or otherwise concerning skin lesions before then.  The patient expressed understanding and is in agreement with this plan.    7. Diffuse photodamage of skin  Diffuse photodamage with actinic changes with telangiectasia and mottled pigmentation in sun-exposed areas.    Photodamage.  The signs and symptoms of skin cancer were reviewed and the patient was advised to practice sun protection and sun avoidance, use daily sunscreen, and perform regular self skin exams.  Sun protection was discussed, including avoiding the mid-day sun, wearing a sunscreen with SPF at least 50, and stressing the need for reapplication of sunscreen and applying more than they think they need.

## 2024-07-31 ENCOUNTER — DOCUMENTATION (OUTPATIENT)
Dept: PHYSICAL THERAPY | Facility: CLINIC | Age: 64
End: 2024-07-31
Payer: COMMERCIAL

## 2024-07-31 NOTE — PROGRESS NOTES
Discharge Summary    Name: Dimitris Schaeffer  MRN: [id number]  : 1960  Date: 24    Discharge Summary: PT    Discharge Information: Date of discharge 2024, Date of last visit 2024, Date of evaluation 2024, Number of attended visits 5, Referred by Nam Ragsdale [facility], and Referred for pes anserine tendinitis and bursitis    Therapy Summary: Patient attended 5 PT visits including his initial evaluation between 2024 and 2024. Treatment consisted of therapeutic exercise to load hamstring tendons and improve hip and knee strength. Manual therapy was also incorporated for symptom management. Patient responded fairly well to PT. Progress was made towards all of his STGs and LTGs. Patient felt he had reached a plateau as he started to no longer noticed a difference in the days when he performed his exercises vs the days when he did not.     Discharge Status: discharged to self care w/ instruction to continue HEP performance and to reach out the office w/ any future needs or concerns.     Rehab Discharge Reason: Progress plateaued; further improvement possible

## 2024-08-06 DIAGNOSIS — E78.00 HYPERCHOLESTEREMIA: ICD-10-CM

## 2024-08-06 RX ORDER — ROSUVASTATIN CALCIUM 5 MG/1
5 TABLET, COATED ORAL NIGHTLY
Qty: 90 TABLET | Refills: 3 | Status: SHIPPED | OUTPATIENT
Start: 2024-08-06 | End: 2025-08-06

## 2024-08-21 DIAGNOSIS — Z79.899 MEDICATION MANAGEMENT: ICD-10-CM

## 2024-08-23 ENCOUNTER — LAB (OUTPATIENT)
Dept: LAB | Facility: LAB | Age: 64
End: 2024-08-23
Payer: COMMERCIAL

## 2024-08-23 DIAGNOSIS — Z00.00 HEALTHCARE MAINTENANCE: ICD-10-CM

## 2024-08-23 DIAGNOSIS — Z79.899 MEDICATION MANAGEMENT: ICD-10-CM

## 2024-08-23 LAB
25(OH)D3 SERPL-MCNC: 82 NG/ML (ref 30–100)
ALBUMIN SERPL BCP-MCNC: 4.4 G/DL (ref 3.4–5)
ALP SERPL-CCNC: 60 U/L (ref 33–136)
ALT SERPL W P-5'-P-CCNC: 20 U/L (ref 10–52)
ANION GAP SERPL CALC-SCNC: 14 MMOL/L (ref 10–20)
APPEARANCE UR: ABNORMAL
AST SERPL W P-5'-P-CCNC: 16 U/L (ref 9–39)
BASOPHILS # BLD AUTO: 0.02 X10*3/UL (ref 0–0.1)
BASOPHILS NFR BLD AUTO: 0.3 %
BILIRUB SERPL-MCNC: 0.7 MG/DL (ref 0–1.2)
BILIRUB UR STRIP.AUTO-MCNC: NEGATIVE MG/DL
BUN SERPL-MCNC: 23 MG/DL (ref 6–23)
CALCIUM SERPL-MCNC: 9.3 MG/DL (ref 8.6–10.6)
CHLORIDE SERPL-SCNC: 104 MMOL/L (ref 98–107)
CHOLEST SERPL-MCNC: 199 MG/DL (ref 0–199)
CHOLESTEROL/HDL RATIO: 3.6
CO2 SERPL-SCNC: 26 MMOL/L (ref 21–32)
COLOR UR: ABNORMAL
CREAT SERPL-MCNC: 1.07 MG/DL (ref 0.5–1.3)
CRP SERPL HS-MCNC: 0.8 MG/L
EGFRCR SERPLBLD CKD-EPI 2021: 77 ML/MIN/1.73M*2
EOSINOPHIL # BLD AUTO: 0.29 X10*3/UL (ref 0–0.7)
EOSINOPHIL NFR BLD AUTO: 5 %
ERYTHROCYTE [DISTWIDTH] IN BLOOD BY AUTOMATED COUNT: 13.2 % (ref 11.5–14.5)
EST. AVERAGE GLUCOSE BLD GHB EST-MCNC: 111 MG/DL
GLUCOSE SERPL-MCNC: 102 MG/DL (ref 74–99)
GLUCOSE UR STRIP.AUTO-MCNC: NORMAL MG/DL
HBA1C MFR BLD: 5.5 %
HCT VFR BLD AUTO: 44.1 % (ref 41–52)
HDLC SERPL-MCNC: 56 MG/DL
HGB BLD-MCNC: 15.2 G/DL (ref 13.5–17.5)
HIV 1+2 AB+HIV1 P24 AG SERPL QL IA: NONREACTIVE
HOLD SPECIMEN: NORMAL
IMM GRANULOCYTES # BLD AUTO: 0.01 X10*3/UL (ref 0–0.7)
IMM GRANULOCYTES NFR BLD AUTO: 0.2 % (ref 0–0.9)
KETONES UR STRIP.AUTO-MCNC: NEGATIVE MG/DL
LDLC SERPL CALC-MCNC: 118 MG/DL
LEUKOCYTE ESTERASE UR QL STRIP.AUTO: NEGATIVE
LYMPHOCYTES # BLD AUTO: 2.03 X10*3/UL (ref 1.2–4.8)
LYMPHOCYTES NFR BLD AUTO: 35 %
MCH RBC QN AUTO: 32 PG (ref 26–34)
MCHC RBC AUTO-ENTMCNC: 34.5 G/DL (ref 32–36)
MCV RBC AUTO: 93 FL (ref 80–100)
MONOCYTES # BLD AUTO: 0.59 X10*3/UL (ref 0.1–1)
MONOCYTES NFR BLD AUTO: 10.2 %
MUCOUS THREADS #/AREA URNS AUTO: NORMAL /LPF
NEUTROPHILS # BLD AUTO: 2.86 X10*3/UL (ref 1.2–7.7)
NEUTROPHILS NFR BLD AUTO: 49.3 %
NITRITE UR QL STRIP.AUTO: NEGATIVE
NON HDL CHOLESTEROL: 143 MG/DL (ref 0–149)
NRBC BLD-RTO: 0 /100 WBCS (ref 0–0)
PH UR STRIP.AUTO: 5.5 [PH]
PLATELET # BLD AUTO: 264 X10*3/UL (ref 150–450)
POTASSIUM SERPL-SCNC: 4.2 MMOL/L (ref 3.5–5.3)
PROT SERPL-MCNC: 7.4 G/DL (ref 6.4–8.2)
PROT UR STRIP.AUTO-MCNC: ABNORMAL MG/DL
PSA SERPL-MCNC: 0.85 NG/ML
RBC # BLD AUTO: 4.75 X10*6/UL (ref 4.5–5.9)
RBC # UR STRIP.AUTO: NEGATIVE /UL
RBC #/AREA URNS AUTO: NORMAL /HPF
SODIUM SERPL-SCNC: 140 MMOL/L (ref 136–145)
SP GR UR STRIP.AUTO: 1.03
TRIGL SERPL-MCNC: 126 MG/DL (ref 0–149)
TSH SERPL-ACNC: 2.11 MIU/L (ref 0.44–3.98)
UROBILINOGEN UR STRIP.AUTO-MCNC: NORMAL MG/DL
VLDL: 25 MG/DL (ref 0–40)
WBC # BLD AUTO: 5.8 X10*3/UL (ref 4.4–11.3)
WBC #/AREA URNS AUTO: NORMAL /HPF

## 2024-08-23 PROCEDURE — 36415 COLL VENOUS BLD VENIPUNCTURE: CPT

## 2024-08-28 LAB — TREPONEMA PALLIDUM IGG+IGM AB [PRESENCE] IN SERUM OR PLASMA BY IMMUNOASSAY: NONREACTIVE

## 2024-08-28 NOTE — PROGRESS NOTES
"Physical Exam    Name Dimitris Schaeffer    Date of Service :2024    Dimitris Schaeffer is a 64 y.o. year old male who is being seen for a comprehensive exam  GERD- EGD done . Unremarkable   MOHS surgery SCC 3/15/24  right dorsal hand   Right knee pain /tendonitis  -  24    referral to PT   till Mid July. Still with some pain. Unable to walk as much as he would like to.     His main health concerns:    Right knee pain/tendonitis in April    with walking up a flight of steps. Pain was a 30 out of 10. No pop. No instability.    On a daily basis can have no pain but still with intermittent pain.  Last 6 weeks.  Good around 3 or  4  Pain is in the front of the knee.   Occ. Stiff and painful.        Patient Active Problem List   Diagnosis    Hyperlipidemia    Chronic GERD    Medication management    Pes anserinus tendinitis and bursitis    Pes anserinus bursitis of right knee        Past Medical History:   Diagnosis Date    Herpes infection         Past Surgical History:   Procedure Laterality Date    ADENOIDECTOMY      HERNIA REPAIR      Inguinal    TONSILLECTOMY          Social History     Tobacco Use    Smoking status: Never    Smokeless tobacco: Never   Vaping Use    Vaping status: Never Used   Substance Use Topics    Alcohol use: Yes     Alcohol/week: 7.0 standard drinks of alcohol     Types: 7 Glasses of wine per week    Drug use: Never      Social History     Social History Narrative    Retired, [place],  from his partner of 40 years.     Smoker - never     exercise- walk daily     diet- balanced. likes to cook. allergic to blueberries, linginberry, cranberry    hobby is 'setting type\"      Walking his dog for exercise.      PSHx  Appendectomy late \"s  and redo from peritonitis   Inguinal hernia bilateral repair   Oral surgery     Family History   Dad-   of COPD age 80, first MI age 61, second 65.   Mom-  mid 80's  chronic depressions  Mgf-  of kidney disease at 40  Mgm- lived to age 98.   " Later dementia   Pgf-  in his late 30's  of a stroke.   Pgm-lived to be 100  1 brother -  alive age 78  Parkinson's disease diagnosed age 76.  Lives in [place]   1 sister- alive lives locally.  11 years older.  She is healthy  Breast cancer diagnoesd 8 years ago.  Developed essential tremor , elvated cholesterol   Much older >10 years brother is 14 years older ,         Current Outpatient Medications:     cimetidine (Tagamet) 200 mg tablet, Take 1 tablet (200 mg) by mouth if needed., Disp: , Rfl:     emtricitabine-tenofovir, TDF, (Truvada) 200-300 mg tablet, Take 1 tablet by mouth once daily., Disp: 90 tablet, Rfl: 3    esomeprazole (NexIUM) 40 mg DR capsule, Take 1 capsule (40 mg) by mouth once daily in the morning. Take before meals., Disp: 90 capsule, Rfl: 3    glucosamine-chondroitin 500-400 mg tablet, Take 1 tablet by mouth 2 times a day., Disp: , Rfl:     ketoconazole (NIZOral) 2 % cream, Apply topically 2 times a day. To affected areas of feet for 3-4 weeks; repeat as needed for flares, Disp: 60 g, Rfl: 11    melatonin 12 mg tablet, Take by mouth if needed., Disp: , Rfl:     rosuvastatin (Crestor) 5 mg tablet, Take 1 tablet (5 mg) by mouth once daily at bedtime., Disp: 90 tablet, Rfl: 3    valACYclovir (Valtrex) 1 gram tablet, Take 1 tablet (1,000 mg) by mouth once daily., Disp: 90 tablet, Rfl: 1    No Known Allergies    Depression Screen  Feels sad but does not feel overtly depressesd. Feels that he is not stuck in his grief and functioning well     ROUTINE:     Immunizations   tdap due, Monkey pox vaccine second dose due  Advised to update covid and flu   PSA 0.85    COLONOSCOPY: 23 repeat 5 years secondary to poor prep     OPHTHALMOLOGY: current.    DERMATOLOGY - 7/15/24      Review of Systems   Constitutional:  Negative for appetite change, fatigue, fever and unexpected weight change.   HENT:  Positive for tinnitus (intermittent).    Respiratory:  Negative for cough.    Cardiovascular:  Negative  "for chest pain.   Gastrointestinal:  Negative for constipation and diarrhea.   Genitourinary:  Negative for difficulty urinating, hematuria and urgency.        /78 (BP Location: Right arm, Patient Position: Sitting)   Pulse 83   Temp 37.2 °C (98.9 °F)   Ht 1.659 m (5' 5.3\")   Wt 66.7 kg (147 lb)   SpO2 97%   BMI 24.24 kg/m²  Body mass index is 24.24 kg/m².    Physical Exam  Constitutional:       Appearance: Normal appearance.   HENT:      Head: Normocephalic.      Right Ear: Tympanic membrane normal.      Left Ear: Tympanic membrane normal.      Ears:      Comments: Heavy wax on left side   Eyes:      General: No scleral icterus.     Extraocular Movements: Extraocular movements intact.      Conjunctiva/sclera: Conjunctivae normal.      Pupils: Pupils are equal, round, and reactive to light.   Cardiovascular:      Rate and Rhythm: Normal rate and regular rhythm.      Pulses: Normal pulses.      Heart sounds: Normal heart sounds. No murmur heard.  Pulmonary:      Effort: No respiratory distress.      Breath sounds: No wheezing, rhonchi or rales.   Abdominal:      General: Abdomen is flat. Bowel sounds are normal.      Palpations: Abdomen is soft. There is no mass.      Tenderness: There is no abdominal tenderness.      Hernia: There is no hernia in the left inguinal area or right inguinal area.   Genitourinary:     Testes: Normal.         Right: Mass not present.         Left: Mass not present.   Musculoskeletal:         General: No swelling. Normal range of motion.      Cervical back: Normal range of motion and neck supple.   Neurological:      General: No focal deficit present.      Mental Status: He is alert.   Psychiatric:         Mood and Affect: Mood normal.         Thought Content: Thought content normal.         RESULTS/DATA:  Reviewed Standard Labs for this physical with patient ( any significant issues addressed in A/P )     ECG:   Sinus arrhythmia. Normal variant ECG      Assessment/Plan   1. " Medication management  -  Continue Truvada  -  HIV screen negative   - Syphilis Screen with Reflex; Future    2. Hyperlipidemia, unspecified hyperlipidemia type  Continue Crestor 5 mg daily. He is not interested at the present time of increasing his statin dose however he does have a family history   - CT cardiac scoring wo IV contrast; Future    3. Annual physical exam  Advised second Monkey Pox vaccination,  Update tdap  Covid and flu   - ECG 12 lead (Clinic Performed)    4. Chronic GERD  Continue Nexium     5. Right knee pain, unspecified chronicity  If not improved despite finishing PT will refer on for MRI  and orthopedic referral     6. Inguinal hernia   On right.  Reducible.  Expectant management. Advised of warnign signs of pain that is not relieved quickly or enlarging       Follow up in one year   Edilma Mason MD

## 2024-08-29 ENCOUNTER — APPOINTMENT (OUTPATIENT)
Dept: PRIMARY CARE | Facility: CLINIC | Age: 64
End: 2024-08-29
Payer: COMMERCIAL

## 2024-08-29 VITALS
DIASTOLIC BLOOD PRESSURE: 78 MMHG | BODY MASS INDEX: 24.49 KG/M2 | OXYGEN SATURATION: 97 % | WEIGHT: 147 LBS | HEART RATE: 83 BPM | TEMPERATURE: 98.9 F | HEIGHT: 65 IN | SYSTOLIC BLOOD PRESSURE: 126 MMHG

## 2024-08-29 DIAGNOSIS — Z00.00 ANNUAL PHYSICAL EXAM: Primary | ICD-10-CM

## 2024-08-29 DIAGNOSIS — M25.561 RIGHT KNEE PAIN, UNSPECIFIED CHRONICITY: ICD-10-CM

## 2024-08-29 DIAGNOSIS — E78.5 HYPERLIPIDEMIA, UNSPECIFIED HYPERLIPIDEMIA TYPE: ICD-10-CM

## 2024-08-29 DIAGNOSIS — Z79.899 MEDICATION MANAGEMENT: ICD-10-CM

## 2024-08-29 DIAGNOSIS — K21.9 CHRONIC GERD: ICD-10-CM

## 2024-08-29 ASSESSMENT — ENCOUNTER SYMPTOMS
UNEXPECTED WEIGHT CHANGE: 0
FATIGUE: 0
CONSTIPATION: 0
FEVER: 0
COUGH: 0
APPETITE CHANGE: 0
DIFFICULTY URINATING: 0
HEMATURIA: 0
DIARRHEA: 0

## 2024-08-29 NOTE — PATIENT INSTRUCTIONS
We discussed getting your second Monkey Pox virus  Due to update your tdap now and   Be sure to update your covid and flu- boosters     Your inguinal hernia does not need to be fixed unless it starts to hurt without relief or enlarges    We discussed intensifying your cholesterol lowering medication to bring your LDL to below 100 which you are not interested in doing yet. We will obtain a Cardiac Calcium Score to better get a sense of your cardiovascular risk.     If your knee pain does not resolve the next step would be to get an MRI of your knee and referral to orthopedics     Follow up in one year or sooner for any concerns

## 2024-09-04 DIAGNOSIS — B00.9 HERPES SIMPLEX: ICD-10-CM

## 2024-09-04 RX ORDER — VALACYCLOVIR HYDROCHLORIDE 1 G/1
1000 TABLET, FILM COATED ORAL DAILY
Qty: 90 TABLET | Refills: 3 | Status: SHIPPED | OUTPATIENT
Start: 2024-09-04

## 2024-11-06 DIAGNOSIS — K21.9 GASTROESOPHAGEAL REFLUX DISEASE, UNSPECIFIED WHETHER ESOPHAGITIS PRESENT: ICD-10-CM

## 2024-11-06 RX ORDER — ESOMEPRAZOLE MAGNESIUM 40 MG/1
40 CAPSULE, DELAYED RELEASE ORAL
Qty: 90 CAPSULE | Refills: 3 | Status: SHIPPED | OUTPATIENT
Start: 2024-11-06

## 2024-11-27 ENCOUNTER — APPOINTMENT (OUTPATIENT)
Dept: RADIOLOGY | Facility: HOSPITAL | Age: 64
End: 2024-11-27
Payer: COMMERCIAL

## 2024-12-09 ENCOUNTER — APPOINTMENT (OUTPATIENT)
Dept: DERMATOLOGY | Facility: CLINIC | Age: 64
End: 2024-12-09
Payer: COMMERCIAL

## 2024-12-09 DIAGNOSIS — Z12.83 SCREENING EXAM FOR SKIN CANCER: ICD-10-CM

## 2024-12-09 DIAGNOSIS — D18.01 HEMANGIOMA OF SKIN: ICD-10-CM

## 2024-12-09 DIAGNOSIS — Z12.83 ENCOUNTER FOR SCREENING FOR MALIGNANT NEOPLASM OF SKIN: ICD-10-CM

## 2024-12-09 DIAGNOSIS — Z85.828 HISTORY OF NONMELANOMA SKIN CANCER: ICD-10-CM

## 2024-12-09 DIAGNOSIS — L82.1 SEBORRHEIC KERATOSIS: ICD-10-CM

## 2024-12-09 DIAGNOSIS — D22.5 MELANOCYTIC NEVUS OF TRUNK: ICD-10-CM

## 2024-12-09 DIAGNOSIS — L57.8 DIFFUSE PHOTODAMAGE OF SKIN: ICD-10-CM

## 2024-12-09 DIAGNOSIS — L57.0 ACTINIC KERATOSIS: Primary | ICD-10-CM

## 2024-12-09 DIAGNOSIS — L21.9 SEBORRHEIC DERMATITIS: ICD-10-CM

## 2024-12-09 PROCEDURE — 99214 OFFICE O/P EST MOD 30 MIN: CPT | Performed by: DERMATOLOGY

## 2024-12-09 PROCEDURE — 17004 DESTROY PREMAL LESIONS 15/>: CPT | Performed by: DERMATOLOGY

## 2024-12-09 RX ORDER — KETOCONAZOLE 20 MG/ML
SHAMPOO, SUSPENSION TOPICAL
Qty: 120 ML | Refills: 11 | Status: SHIPPED | OUTPATIENT
Start: 2024-12-09

## 2024-12-09 RX ORDER — KETOCONAZOLE 20 MG/G
CREAM TOPICAL 2 TIMES DAILY
Qty: 60 G | Refills: 11 | Status: SHIPPED | OUTPATIENT
Start: 2024-12-09

## 2024-12-09 NOTE — PROGRESS NOTES
Subjective     Dimitris Schaeffer is a 64 y.o. male who presents for the following: Skin Check.  He notes dry, scaly patches on his scalp and face, especially in his eyebrows.  He denies any new, changing, or concerning skin lesions since his last visit; no bleeding, itching, or burning lesions.      Review of Systems:  No other skin or systemic complaints other than what is documented elsewhere in the note.    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 Kelly on 3/15/24  - AKs  - no h/o melanoma    Family History:    No known family history of skin cancer    Social History:    The patient states he is originally from [place], but then worked as a  in the [employer] in [employer] and now lives in [place]; his sister is a patient in our office as well as was his partner, Gustavo, but he recently passed away    Allergies:  Patient has no known allergies.    Current Medications / CAM's:    Current Outpatient Medications:     cimetidine (Tagamet) 200 mg tablet, Take 1 tablet (200 mg) by mouth if needed., Disp: , Rfl:     emtricitabine-tenofovir, TDF, (Truvada) 200-300 mg tablet, Take 1 tablet by mouth once daily., Disp: 90 tablet, Rfl: 3    esomeprazole (NexIUM) 40 mg DR capsule, Take 1 capsule (40 mg) by mouth once daily in the morning. Take before meals., Disp: 90 capsule, Rfl: 3    glucosamine-chondroitin 500-400 mg tablet, Take 1 tablet by mouth 2 times a day., Disp: , Rfl:     melatonin 12 mg tablet, Take by mouth if needed., Disp: , Rfl:     rosuvastatin (Crestor) 5 mg tablet, Take 1 tablet (5 mg) by mouth once  daily at bedtime., Disp: 90 tablet, Rfl: 3    valACYclovir (Valtrex) 1 gram tablet, Take 1 tablet (1,000 mg) by mouth once daily., Disp: 90 tablet, Rfl: 3    ketoconazole (NIZOral) 2 % cream, Apply topically 2 times a day. To affected areas of feet for 3-4 weeks; repeat as needed for flares, Disp: 60 g, Rfl: 11    ketoconazole (NIZOral) 2 % shampoo, Lather on to affected area. Let sit for 5 minutes. Rinse thoroughly. Use 2-3 times per week until symptoms controlled then as needed for maintenance., Disp: 120 mL, Rfl: 11     Objective   Well appearing patient in no apparent distress; mood and affect are within normal limits.    A full examination was performed including scalp, face, eyes, ears, nose, lips, neck, chest, axillae, abdomen, back, bilateral upper extremities, and bilateral lower extremities. All findings within normal limits unless otherwise noted below.    Assessment/Plan   1. Actinic keratosis (16)  Head - Anterior (Face) (16)  Scattered on the patient's face, scalp, and bilateral dorsal hands, there are multiple erythematous, gritty, scaly macules     Actinic Keratoses -scattered on face, scalp, and bilateral dorsal hands.  The pre-cancerous nature of these lesions and treatment options were discussed with the patient today.  At this time, we recommend treatment with liquid nitrogen cryotherapy.  The patient expressed understanding, is in agreement with this plan, and wishes to proceed with cryotherapy today.    Destr of lesion - Head - Anterior (Face) (16)  Complexity: simple    Destruction method: cryotherapy    Informed consent: discussed and consent obtained    Lesion destroyed using liquid nitrogen: Yes    Cryotherapy cycles:  1  Outcome: patient tolerated procedure well with no complications    Post-procedure details: wound care instructions given      2. Seborrheic dermatitis  Head - Anterior (Face)  On the patient's scalp and face, mainly the glabella and bilateral eyebrows and perinasal  creases, there are pink, scaly patches with whitish-yellowish, greasy scale    Seborrheic Dermatitis - flare on scalp and face.  The potentially chronic and intermittently flaring nature of this condition and treatment options were discussed extensively with the patient today.  At this time, for his scalp, we recommend topical anti-fungal therapy with Ketoconazole 2% shampoo, which the patient was instructed to use 2-3 days per week, alternating with over-the-counter anti-dandruff shampoos, such as Head & Shoulders, Selsun Blue, and Neutrogena T-gel, every month.  In addition, for his face, we recommend topical anti-fungal therapy with Ketoconazole 2% cream, which the patient was instructed to apply twice daily to the affected areas of the face.  The risks, benefits, and side effects of these medications were discussed.  The patient expressed understanding and is in agreement with this plan.    ketoconazole (NIZOral) 2 % shampoo - Head - Anterior (Face)  Lather on to affected area. Let sit for 5 minutes. Rinse thoroughly. Use 2-3 times per week until symptoms controlled then as needed for maintenance.    3. Melanocytic nevus of trunk  Scattered on the patient's face, neck, trunk, and extremities, there are several small, round- to oval-shaped, brown-pigmented and pink-colored, symmetric, uniform-appearing macules and dome-shaped papules    Clinically benign- to slightly atypical-appearing nevi - the clinically benign- to slightly atypical-appearing nature of the patient's nevi was discussed with the patient today.  None of the patient's nevi meet threshold for biopsy today.  We emphasized the importance of performing monthly self-skin exams using the ABCDs of monitoring moles, which were reviewed with the patient today and an informational hand-out provided.  We also emphasized the importance of sun avoidance and sun protection with daily sunscreen use.  The patient expressed understanding and is in agreement with  this plan.    4. Seborrheic keratosis  Scattered on the patient's face, neck, trunk, and extremities, there are several tan- to light brown-colored, hyperkeratotic, stuck-on appearing papules of varying size and shape    Seborrheic Keratoses - the benign nature of these lesions was discussed with the patient today and reassurance provided.  No treatment is medically indicated for these lesions at this time.    5. Hemangioma of skin  Scattered on the patient's face, neck, trunk, and extremities, there are multiple small, round, cherry red- to purplish-colored, symmetric, uniform, vascular-appearing macules and papules    Cherry Angiomas - the benign nature of these vascular lesions was discussed with the patient today and reassurance provided.  No treatment is medically indicated for these lesions at this time.    6. History of nonmelanoma skin cancer  On the patient's right proximal dorsal hand, there is a well-healed scar with no evidence of recurrent growth on exam today.    History of squamous cell carcinoma in situ and actinic keratoses and photodamage.  There is no evidence of recurrence at the site of the patient's previously treated SCC in situ on his right proximal dorsal hand on exam today.  The signs and symptoms of skin cancer were reviewed and the patient was advised to practice sun protection and sun avoidance, use daily sunscreen, and perform regular self skin exams.  We will have the patient return to our office in 4-6 months for routine follow-up and skin exam, and the patient was instructed to call our office should the patient notice any new, changing, symptomatic, or otherwise concerning skin lesions before then.  The patient expressed understanding and is in agreement with this plan.    7. Diffuse photodamage of skin  Diffuse photodamage with actinic changes with telangiectasia and mottled pigmentation in sun-exposed areas.    Photodamage.  The signs and symptoms of skin cancer were reviewed and  the patient was advised to practice sun protection and sun avoidance, use daily sunscreen, and perform regular self skin exams.  Sun protection was discussed, including avoiding the mid-day sun, wearing a sunscreen with SPF at least 50, and stressing the need for reapplication of sunscreen and applying more than they think they need.    8. Encounter for screening for malignant neoplasm of skin    Related Medications  ketoconazole (NIZOral) 2 % cream  Apply topically 2 times a day. To affected areas of feet for 3-4 weeks; repeat as needed for flares    9. Screening exam for skin cancer    Related Procedures  Follow Up In Dermatology - Established Patient      Patient seen by Dr. Miranda Castorena, PGY-2      I saw and evaluated the patient. I personally obtained the key and critical portions of the history and physical exam or was physically present for key and critical portions performed by the resident/fellow. I reviewed the resident/fellow's documentation and discussed the patient with the resident/fellow. I agree with the resident/fellow's medical decision making as documented in the note.    Linden Bey MD

## 2025-01-24 ENCOUNTER — APPOINTMENT (OUTPATIENT)
Dept: DERMATOLOGY | Facility: CLINIC | Age: 65
End: 2025-01-24
Payer: COMMERCIAL

## 2025-02-10 ENCOUNTER — HOSPITAL ENCOUNTER (OUTPATIENT)
Dept: RADIOLOGY | Facility: HOSPITAL | Age: 65
Discharge: HOME | End: 2025-02-10
Payer: COMMERCIAL

## 2025-02-10 DIAGNOSIS — E78.5 HYPERLIPIDEMIA, UNSPECIFIED HYPERLIPIDEMIA TYPE: ICD-10-CM

## 2025-02-10 PROCEDURE — 75571 CT HRT W/O DYE W/CA TEST: CPT

## 2025-04-28 ENCOUNTER — TELEPHONE (OUTPATIENT)
Dept: PRIMARY CARE | Facility: CLINIC | Age: 65
End: 2025-04-28
Payer: COMMERCIAL

## 2025-04-28 NOTE — TELEPHONE ENCOUNTER
Patient is concerned of a spot on rights side of tongue, thought possibly could be a kanker sore. X 2 weeks off and on, not open tender to touch. He has not had any colds or anything.  He wants to know if he should see his dentist or see you?  He is traveling and will be available next week.      Please advise  [phone]

## 2025-05-08 ENCOUNTER — APPOINTMENT (OUTPATIENT)
Dept: PRIMARY CARE | Facility: CLINIC | Age: 65
End: 2025-05-08
Payer: COMMERCIAL

## 2025-05-08 VITALS
HEART RATE: 82 BPM | SYSTOLIC BLOOD PRESSURE: 126 MMHG | OXYGEN SATURATION: 95 % | TEMPERATURE: 98.6 F | DIASTOLIC BLOOD PRESSURE: 80 MMHG

## 2025-05-08 DIAGNOSIS — Z72.52 HIGH RISK HOMOSEXUAL BEHAVIOR: Primary | ICD-10-CM

## 2025-05-08 DIAGNOSIS — K40.91 RECURRENT INGUINAL HERNIA WITHOUT OBSTRUCTION OR GANGRENE, UNSPECIFIED LATERALITY: ICD-10-CM

## 2025-05-08 PROCEDURE — 99215 OFFICE O/P EST HI 40 MIN: CPT | Performed by: INTERNAL MEDICINE

## 2025-05-08 RX ORDER — DOXYCYCLINE HYCLATE 100 MG
TABLET ORAL AS NEEDED
COMMUNITY
Start: 2025-02-10

## 2025-05-08 NOTE — PROGRESS NOTES
Subjective   Patient ID: Dimitris Schaeffer is a 64 y.o. male who presents for Mouth Lesions.    Last CPE 8/29/24    HPI   spot on the right side of his tongue for 3 to 4 weeks. Feels he may have been biting it due to increased anxiety.  It was more painful in the beginnng. Not painful anymore.  He has no other lesions .  He would also like a referral to general surgery for further evaluation of an inguinal hernia     PMHx  HLD  GERD- EGD done . Unremarkable   MOHS surgery SCC 3/15/24  right dorsal hand   Right knee pain /tendonitis  -  4/8/24      Right inguinal hernia       Review of Systems    Objective   /80 (BP Location: Left arm, Patient Position: Sitting)   Pulse 82   Temp 37 °C (98.6 °F)   SpO2 95%     Physical Exam    Whitish /scar like lesion on lateral right tongue. No other lesions  Rest of HEENT exam unremarkable     Assessment/Plan     Given that it was painful and now not and he believes it maybe from biting his tongue okay to watch it for a few weeks.  I did  him however if not improved or changes would have a low threshold to refer on to ENT for biopsy.      Routine labs ordered today for his [facility]  Referral to general surgery for  hernia evaluation.      Return for CPE in August     TVT of 45 minutes with greater than 50% spent FTF in assessment/discussion and care coordination

## 2025-05-09 LAB
C TRACH RRNA SPEC QL NAA+PROBE: NOT DETECTED
HCV AB SERPL QL IA: NORMAL
HIV 1+2 AB+HIV1 P24 AG SERPL QL IA: NORMAL
N GONORRHOEA RRNA SPEC QL NAA+PROBE: NOT DETECTED
QUEST GC CT AMPLIFIED (ALWAYS MESSAGE): NORMAL
T PALLIDUM AB SER QL IA: NORMAL

## 2025-05-13 DIAGNOSIS — Z72.52 HIGH RISK HOMOSEXUAL BEHAVIOR: Primary | ICD-10-CM

## 2025-05-13 LAB
C TRACH RRNA SPEC QL NAA+PROBE: NOT DETECTED
HCV AB SERPL QL IA: NORMAL
HIV 1+2 AB+HIV1 P24 AG SERPL QL IA: NORMAL
N GONORRHOEA RRNA SPEC QL NAA+PROBE: NOT DETECTED
QUEST GC CT AMPLIFIED (ALWAYS MESSAGE): NORMAL
T PALLIDUM AB SER QL IA: NEGATIVE

## 2025-05-13 RX ORDER — EMTRICITABINE AND TENOFOVIR DISOPROXIL FUMARATE 200; 300 MG/1; MG/1
1 TABLET, FILM COATED ORAL DAILY
Qty: 90 TABLET | Refills: 3 | Status: SHIPPED | OUTPATIENT
Start: 2025-05-13 | End: 2026-05-13

## 2025-06-02 ENCOUNTER — APPOINTMENT (OUTPATIENT)
Dept: SURGERY | Facility: CLINIC | Age: 65
End: 2025-06-02
Payer: MEDICARE

## 2025-06-02 DIAGNOSIS — K40.90 RIGHT INGUINAL HERNIA: Primary | ICD-10-CM

## 2025-06-02 DIAGNOSIS — K40.91 RECURRENT INGUINAL HERNIA WITHOUT OBSTRUCTION OR GANGRENE, UNSPECIFIED LATERALITY: ICD-10-CM

## 2025-06-02 PROCEDURE — 1126F AMNT PAIN NOTED NONE PRSNT: CPT | Performed by: SURGERY

## 2025-06-02 PROCEDURE — 99203 OFFICE O/P NEW LOW 30 MIN: CPT | Performed by: SURGERY

## 2025-06-02 PROCEDURE — 1159F MED LIST DOCD IN RCRD: CPT | Performed by: SURGERY

## 2025-06-02 PROCEDURE — 1036F TOBACCO NON-USER: CPT | Performed by: SURGERY

## 2025-06-02 ASSESSMENT — ENCOUNTER SYMPTOMS
CARDIOVASCULAR NEGATIVE: 1
MUSCULOSKELETAL NEGATIVE: 1
RESPIRATORY NEGATIVE: 1
NEUROLOGICAL NEGATIVE: 1
GASTROINTESTINAL NEGATIVE: 1
HEMATOLOGIC/LYMPHATIC NEGATIVE: 1
CONSTITUTIONAL NEGATIVE: 1
ENDOCRINE NEGATIVE: 1
EYES NEGATIVE: 1

## 2025-06-02 NOTE — LETTER
June 2, 2025     Edilma Mason MD  [address] [place] [facility]  [facility] [address]    Patient: Dimitris Schaeffer   YOB: 1960   Date of Visit: 6/2/2025       Dear Dr. Edilma Mason MD:    Thank you for referring Dimitris Schaeffer to me for evaluation. Below are my notes for this consultation.  If you have questions, please do not hesitate to call me. I look forward to following your patient along with you.       Sincerely,     Ric Salgado MD      CC: No Recipients  ______________________________________________________________________________________    Subjective  Patient ID: Dimitris Schaeffer is a 65 y.o. male who presents for No chief complaint on file..  HPI Patient present for evaluation of recurrent right inguinal hernia.     Bilateral hernia repair with mesh performed back in 2019 in [place].  Shortly after the operation he has noted recurrence of a right inguinal hernia that occasionally causes some discomfort.    Surgical history also includes open appendectomy that had complications requiring reoperation several days after the initial procedure.  This was performed roughly 20 years ago.    He feels that he may have a hernia involving this incision as well.    Review of Systems   Constitutional: Negative.    HENT: Negative.     Eyes: Negative.    Respiratory: Negative.     Cardiovascular: Negative.    Gastrointestinal: Negative.    Endocrine: Negative.    Genitourinary: Negative.    Musculoskeletal: Negative.    Skin: Negative.    Neurological: Negative.    Hematological: Negative.        Past Medical History:  Diagnosis Date   • Herpes infection         Past Surgical History:  Procedure Laterality Date   • ADENOIDECTOMY     • HERNIA REPAIR      Inguinal   • TONSILLECTOMY              Objective    Physical Exam  Constitutional:       Appearance: Normal appearance. He is normal weight.   HENT:      Head: Normocephalic.      Right Ear: Tympanic membrane and  external ear normal.      Left Ear: Tympanic membrane and external ear normal.      Nose: Nose normal.      Mouth/Throat:      Mouth: Mucous membranes are moist.      Pharynx: Oropharynx is clear.   Eyes:      Extraocular Movements: Extraocular movements intact.      Pupils: Pupils are equal, round, and reactive to light.   Cardiovascular:      Rate and Rhythm: Normal rate and regular rhythm.      Pulses: Normal pulses.      Heart sounds: Normal heart sounds.   Pulmonary:      Effort: Pulmonary effort is normal.      Breath sounds: Normal breath sounds.   Abdominal:      General: Abdomen is flat. Bowel sounds are normal.      Palpations: Abdomen is soft.   Genitourinary:     Comments: Right inguinal hernia  Musculoskeletal:         General: Normal range of motion.      Cervical back: Normal range of motion and neck supple.   Skin:     General: Skin is warm and dry.      Capillary Refill: Capillary refill takes less than 2 seconds.   Neurological:      General: No focal deficit present.      Mental Status: He is alert and oriented to person, place, and time. Mental status is at baseline.       There is no left inguinal hernia.  There is an obvious small right inguinal hernia that reduces easily.  Just above his right inguinal ligament there is an incision from previous open appendectomy.  There may be a small hernia here.  Will further evaluate both sites with a CT scan    Assessment/Plan  Diagnoses and all orders for this visit:  Right inguinal hernia  -     CT pelvis wo IV contrast; Future  Recurrent inguinal hernia without obstruction or gangrene, unspecified laterality  -     Referral to General Surgery  -     CT pelvis wo IV contrast; Future  -     Case Request Operating Room: REPAIR, HERNIA, INGUINAL, ROBOT-ASSISTED; Standing  Other orders  -     Place in outpatient/hospital ambulatory surgery; Standing  -     Full code; Standing  -     NPO Diet Except: Sips with meds; Effective now; Standing  -     Height and  weight; Standing  -     Insert and maintain peripheral IV; Standing  -     Saline lock IV; Standing    Impression: Recurrent right inguinal hernia.  Also possible hernia involving incision from previous appendectomy.  Will get a CT scan to evaluate his abdominal wall in this area.    Will put him on the operative schedule for robotic right inguinal hernia repair with mesh.  We discussed the procedure to include risk, benefits and alternatives.  He agrees to the plan

## 2025-06-02 NOTE — PATIENT INSTRUCTIONS
To further evaluate your abdominal wall I do recommend getting a CT scan    Please call the number listed below to schedule the scan.    Give my  office a call once you've gotten the study so that we may discuss the results.

## 2025-06-02 NOTE — PROGRESS NOTES
Subjective   Patient ID: Dimitris Schaeffer is a 65 y.o. male who presents for No chief complaint on file..  HPI Patient present for evaluation of recurrent right inguinal hernia.     Bilateral hernia repair with mesh performed back in 2019 in [place].  Shortly after the operation he has noted recurrence of a right inguinal hernia that occasionally causes some discomfort.    Surgical history also includes open appendectomy that had complications requiring reoperation several days after the initial procedure.  This was performed roughly 20 years ago.    He feels that he may have a hernia involving this incision as well.    Review of Systems   Constitutional: Negative.    HENT: Negative.     Eyes: Negative.    Respiratory: Negative.     Cardiovascular: Negative.    Gastrointestinal: Negative.    Endocrine: Negative.    Genitourinary: Negative.    Musculoskeletal: Negative.    Skin: Negative.    Neurological: Negative.    Hematological: Negative.        Past Medical History:  Diagnosis Date    Herpes infection         Past Surgical History:  Procedure Laterality Date    ADENOIDECTOMY      HERNIA REPAIR      Inguinal    TONSILLECTOMY              Objective     Physical Exam  Constitutional:       Appearance: Normal appearance. He is normal weight.   HENT:      Head: Normocephalic.      Right Ear: Tympanic membrane and external ear normal.      Left Ear: Tympanic membrane and external ear normal.      Nose: Nose normal.      Mouth/Throat:      Mouth: Mucous membranes are moist.      Pharynx: Oropharynx is clear.   Eyes:      Extraocular Movements: Extraocular movements intact.      Pupils: Pupils are equal, round, and reactive to light.   Cardiovascular:      Rate and Rhythm: Normal rate and regular rhythm.      Pulses: Normal pulses.      Heart sounds: Normal heart sounds.   Pulmonary:      Effort: Pulmonary effort is normal.      Breath sounds: Normal breath sounds.   Abdominal:      General: Abdomen is flat. Bowel  sounds are normal.      Palpations: Abdomen is soft.   Genitourinary:     Comments: Right inguinal hernia  Musculoskeletal:         General: Normal range of motion.      Cervical back: Normal range of motion and neck supple.   Skin:     General: Skin is warm and dry.      Capillary Refill: Capillary refill takes less than 2 seconds.   Neurological:      General: No focal deficit present.      Mental Status: He is alert and oriented to person, place, and time. Mental status is at baseline.       There is no left inguinal hernia.  There is an obvious small right inguinal hernia that reduces easily.  Just above his right inguinal ligament there is an incision from previous open appendectomy.  There may be a small hernia here.  Will further evaluate both sites with a CT scan    Assessment/Plan   Diagnoses and all orders for this visit:  Right inguinal hernia  -     CT pelvis wo IV contrast; Future  Recurrent inguinal hernia without obstruction or gangrene, unspecified laterality  -     Referral to General Surgery  -     CT pelvis wo IV contrast; Future  -     Case Request Operating Room: REPAIR, HERNIA, INGUINAL, ROBOT-ASSISTED; Standing  Other orders  -     Place in outpatient/hospital ambulatory surgery; Standing  -     Full code; Standing  -     NPO Diet Except: Sips with meds; Effective now; Standing  -     Height and weight; Standing  -     Insert and maintain peripheral IV; Standing  -     Saline lock IV; Standing    Impression: Recurrent right inguinal hernia.  Also possible hernia involving incision from previous appendectomy.  Will get a CT scan to evaluate his abdominal wall in this area.    Will put him on the operative schedule for robotic right inguinal hernia repair with mesh.  We discussed the procedure to include risk, benefits and alternatives.  He agrees to the plan

## 2025-06-03 DIAGNOSIS — Z00.00 HEALTH MAINTENANCE EXAMINATION: ICD-10-CM

## 2025-06-05 ASSESSMENT — ENCOUNTER SYMPTOMS
OCCASIONAL FEELINGS OF UNSTEADINESS: 0
LOSS OF SENSATION IN FEET: 0
DEPRESSION: 0

## 2025-06-05 ASSESSMENT — PAIN SCALES - GENERAL: PAINLEVEL_OUTOF10: 0-NO PAIN

## 2025-06-09 ENCOUNTER — APPOINTMENT (OUTPATIENT)
Dept: DERMATOLOGY | Facility: CLINIC | Age: 65
End: 2025-06-09
Payer: COMMERCIAL

## 2025-06-09 DIAGNOSIS — D18.01 HEMANGIOMA OF SKIN: ICD-10-CM

## 2025-06-09 DIAGNOSIS — D22.5 MELANOCYTIC NEVUS OF TRUNK: ICD-10-CM

## 2025-06-09 DIAGNOSIS — L57.8 DIFFUSE PHOTODAMAGE OF SKIN: ICD-10-CM

## 2025-06-09 DIAGNOSIS — B35.3 TINEA PEDIS OF RIGHT FOOT: ICD-10-CM

## 2025-06-09 DIAGNOSIS — L21.9 SEBORRHEIC DERMATITIS: ICD-10-CM

## 2025-06-09 DIAGNOSIS — L82.1 SEBORRHEIC KERATOSIS: ICD-10-CM

## 2025-06-09 DIAGNOSIS — Z85.828 HISTORY OF NONMELANOMA SKIN CANCER: ICD-10-CM

## 2025-06-09 DIAGNOSIS — L57.0 ACTINIC KERATOSIS: ICD-10-CM

## 2025-06-09 DIAGNOSIS — Z12.83 ENCOUNTER FOR SCREENING FOR MALIGNANT NEOPLASM OF SKIN: ICD-10-CM

## 2025-06-09 DIAGNOSIS — D48.5 NEOPLASM OF UNCERTAIN BEHAVIOR OF SKIN: Primary | ICD-10-CM

## 2025-06-09 PROCEDURE — 1159F MED LIST DOCD IN RCRD: CPT | Performed by: DERMATOLOGY

## 2025-06-09 PROCEDURE — 11306 SHAVE SKIN LESION 0.6-1.0 CM: CPT | Performed by: DERMATOLOGY

## 2025-06-09 PROCEDURE — 17004 DESTROY PREMAL LESIONS 15/>: CPT | Performed by: DERMATOLOGY

## 2025-06-09 PROCEDURE — 99214 OFFICE O/P EST MOD 30 MIN: CPT | Performed by: DERMATOLOGY

## 2025-06-09 ASSESSMENT — DERMATOLOGY QUALITY OF LIFE (QOL) ASSESSMENT
RATE HOW BOTHERED YOU ARE BY SYMPTOMS OF YOUR SKIN PROBLEM (EG, ITCHING, STINGING BURNING, HURTING OR SKIN IRRITATION): 0 - NEVER BOTHERED
RATE HOW BOTHERED YOU ARE BY EFFECTS OF YOUR SKIN PROBLEMS ON YOUR ACTIVITIES (EG, GOING OUT, ACCOMPLISHING WHAT YOU WANT, WORK ACTIVITIES OR YOUR RELATIONSHIPS WITH OTHERS): 0 - NEVER BOTHERED
DATE THE QUALITY-OF-LIFE ASSESSMENT WAS COMPLETED: 67365
WHAT SINGLE SKIN CONDITION LISTED BELOW IS THE PATIENT ANSWERING THE QUALITY-OF-LIFE ASSESSMENT QUESTIONS ABOUT: NONE OF THE ABOVE
ARE THERE EXCLUSIONS OR EXCEPTIONS FOR THE QUALITY OF LIFE ASSESSMENT: NO
RATE HOW EMOTIONALLY BOTHERED YOU ARE BY YOUR SKIN PROBLEM (FOR EXAMPLE, WORRY, EMBARRASSMENT, FRUSTRATION): 0 - NEVER BOTHERED

## 2025-06-09 ASSESSMENT — ITCH NUMERIC RATING SCALE: HOW SEVERE IS YOUR ITCHING?: 0

## 2025-06-09 ASSESSMENT — PATIENT GLOBAL ASSESSMENT (PGA): PATIENT GLOBAL ASSESSMENT: PATIENT GLOBAL ASSESSMENT:  1 - CLEAR

## 2025-06-09 ASSESSMENT — DERMATOLOGY PATIENT ASSESSMENT
DO YOU USE SUNSCREEN: OCCASIONALLY
DO YOU HAVE ANY NEW OR CHANGING LESIONS: NO
DO YOU USE A TANNING BED: NO

## 2025-06-09 NOTE — Clinical Note
Tinea Pedis - bilateral feet.  The fungal nature of this condition and treatment options were discussed extensively with the patient today.  At this time, we recommend topical anti-fungal therapy with Ketoconazole 2% cream, which the patient was instructed to apply twice daily to the affected areas of the feet for the next 3-4 weeks; the patient may repeat treatment in a similar fashion as needed for future flares.  The risks, benefits, and side effects of this medication were discussed.  The patient expressed understanding and is in agreement with this plan.

## 2025-06-09 NOTE — PROGRESS NOTES
Subjective     Dimitris Schaeffer is a 65 y.o. male who presents for the following: Skin Exam.  He notes a scaly bump on the top of his right hand, which has been present for 3 months, hurts to touch, and does not heal.  He also notes scaling and persistent athlete's foot infection on his feet despite use of over-the-counter products.  He denies any other new, changing, or concerning skin lesions since his last visit; no bleeding, itching, or burning lesions.      Review of Systems:  No other skin or systemic complaints other than what is documented elsewhere in the note.    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 Kelly on 3/15/24  - AKs  - seborrheic dermatitis  - no h/o melanoma    Family History:    No known family history of skin cancer    Social History:    The patient states he is originally from [place], but then worked as a  in the [employer] in [facility] and now lives in [place]; his sister is a patient in our office as well as was his partner, Gustaov, but he recently passed away    Allergies:  Patient has no known allergies.    Current Medications / CAM's:  Current Medications[1]     Objective   Well appearing patient in no apparent distress; mood and affect are within normal limits.    A full examination was performed including scalp, face, eyes, ears, nose, lips, neck, chest, axillae, abdomen, back, bilateral upper extremities, and bilateral lower extremities. All findings within normal limits unless otherwise noted below.    Assessment/Plan   Skin Exam  1.  NEOPLASM OF UNCERTAIN BEHAVIOR OF SKIN  Right distal dorsal hand  6 mm erythematous, scaly, tender papule     Shave removal    Lesion length (cm):  0.6  Lesion width (cm):  0.6  Margin per side (cm):  0  Lesion diameter (cm):  0.6  Informed consent: discussed and consent obtained    Timeout: patient name, date of birth, surgical site, and procedure verified    Procedure prep:  Patient was prepped and draped  Anesthesia: the lesion was anesthetized in a standard fashion    Anesthetic:  1% lidocaine w/ epinephrine 1-100,000 local infiltration  Instrument used: flexible razor blade    Hemostasis achieved with: aluminum chloride    Outcome: patient tolerated procedure well    Post-procedure details: sterile dressing applied and wound care instructions given    Dressing type: bandage and petrolatum    Specimen 1 - Dermatopathology- DERM LAB  Differential Diagnosis: HAK vs. SCCIS  Check Margins Yes/No?:    Comments:    Dermpath Lab: Routine Histopathology (formalin-fixed tissue)  2. ACTINIC KERATOSIS (18)  Head - Anterior (Face) (18)  Scattered on the patient's face, scalp, and bilateral dorsal hands, there are multiple erythematous, gritty, scaly macules   Actinic Keratoses -scattered on face, scalp, and bilateral dorsal hands.  The pre-cancerous nature of these lesions and treatment options were discussed with the patient today.  At this time, we recommend treatment with liquid nitrogen cryotherapy.  The patient expressed understanding, is in agreement with this plan, and wishes to proceed with cryotherapy today.  Destr of lesion - Head - Anterior (Face) (18)  Complexity: simple    Destruction method: cryotherapy    Informed consent: discussed and consent obtained    Lesion destroyed using liquid nitrogen: Yes    Cryotherapy cycles:  1  Outcome: patient tolerated procedure well with no complications    Post-procedure details: wound care instructions given    3. TINEA PEDIS OF RIGHT FOOT  Bilateral feet  On the patient's  bilateral feet, there is moist scaling with maceration and fissures in the lateral webspaces and erythematous, scaly, thin plaques in a moccasin-type distribution on the soles and heels.  Tinea Pedis - bilateral feet.  The fungal nature of this condition and treatment options were discussed extensively with the patient today.  At this time, we recommend topical anti-fungal therapy with Ketoconazole 2% cream, which the patient was instructed to apply twice daily to the affected areas of the feet for the next 3-4 weeks; the patient may repeat treatment in a similar fashion as needed for future flares.  The risks, benefits, and side effects of this medication were discussed.  The patient expressed understanding and is in agreement with this plan.  4. MELANOCYTIC NEVUS OF TRUNK  Generalized  Scattered on the patient's face, neck, trunk, and extremities, there are several small, round- to oval-shaped, brown-pigmented and pink-colored, symmetric, uniform-appearing macules and dome-shaped papules  Clinically benign- to slightly atypical-appearing nevi - the clinically benign- to slightly atypical-appearing nature of the patient's nevi was discussed with the patient today.  None of the patient's nevi meet threshold for biopsy today.  We emphasized the importance of performing monthly self-skin exams using the ABCDs of monitoring moles, which were reviewed with the patient today and an informational hand-out provided.  We also emphasized the importance of sun avoidance and sun protection with daily sunscreen use.  The patient expressed understanding and is in agreement with this plan.  5. SEBORRHEIC KERATOSIS  Generalized  Scattered on the patient's face, neck, trunk, and extremities, there are several tan- to light brown-colored, hyperkeratotic, stuck-on appearing papules of varying size and shape  Seborrheic Keratoses - the benign nature of these lesions was discussed with the patient today and reassurance provided.  No  treatment is medically indicated for these lesions at this time.  6. SEBORRHEIC DERMATITIS  Head - Anterior (Face)  On the patient's chest, scalp and face, mainly the glabella and bilateral eyebrows and perinasal creases, there are pink, scaly patches with whitish-yellowish, greasy scale  Seborrheic Dermatitis - flare on chest, scalp and face.  The potentially chronic and intermittently flaring nature of this condition and treatment options were discussed extensively with the patient today.  At this time, for his scalp, we recommend topical anti-fungal therapy with Ketoconazole 2% shampoo, which the patient was instructed to use 2-3 days per week, alternating with over-the-counter anti-dandruff shampoos, such as Head & Shoulders, Selsun Blue, and Neutrogena T-gel, every month.  In addition, for his face, we recommend topical anti-fungal therapy with Ketoconazole 2% cream, which the patient was instructed to apply twice daily to the affected areas of the face.  The risks, benefits, and side effects of these medications were discussed.  The patient expressed understanding and is in agreement with this plan.  Related Medications  ketoconazole (NIZOral) 2 % shampoo  Lather on to affected area. Let sit for 5 minutes. Rinse thoroughly. Use 2-3 times per week until symptoms controlled then as needed for maintenance.  7. HEMANGIOMA OF SKIN  Generalized  Scattered on the patient's face, neck, trunk, and extremities, there are multiple small, round, cherry red- to purplish-colored, symmetric, uniform, vascular-appearing macules and papules  Cherry Angiomas - the benign nature of these vascular lesions was discussed with the patient today and reassurance provided.  No treatment is medically indicated for these lesions at this time.  8. HISTORY OF NONMELANOMA SKIN CANCER  Generalized  On the patient's right proximal dorsal hand, there is a well-healed scar with no evidence of recurrent growth on exam today.  History of squamous  cell carcinoma in situ and actinic keratoses and photodamage.  There is no evidence of recurrence at the site of the patient's previously treated SCC in situ on his right proximal dorsal hand on exam today.  The signs and symptoms of skin cancer were reviewed and the patient was advised to practice sun protection and sun avoidance, use daily sunscreen, and perform regular self skin exams.  We will have the patient return to our office in 4-6 months, pending the above biopsy result, for routine follow-up and skin exam, and the patient was instructed to call our office should the patient notice any new, changing, symptomatic, or otherwise concerning skin lesions before then.  The patient expressed understanding and is in agreement with this plan.  9. DIFFUSE PHOTODAMAGE OF SKIN  Generalized  Diffuse photodamage with actinic changes with telangiectasia and mottled pigmentation in sun-exposed areas.  Photodamage.  The signs and symptoms of skin cancer were reviewed and the patient was advised to practice sun protection and sun avoidance, use daily sunscreen, and perform regular self skin exams.  Sun protection was discussed, including avoiding the mid-day sun, wearing a sunscreen with SPF at least 50, and stressing the need for reapplication of sunscreen and applying more than they think they need.  10. ENCOUNTER FOR SCREENING FOR MALIGNANT NEOPLASM OF SKIN      Related Medications  ketoconazole (NIZOral) 2 % cream  Apply topically 2 times a day. To affected areas of feet for 3-4 weeks; repeat as needed for flares       Dhara Valle MD  Department of Dermatology      I saw and evaluated the patient. I personally obtained the key and critical portions of the history and physical exam or was physically present for key and critical portions performed by the resident/fellow. I reviewed the resident/fellow's documentation and discussed the patient with the resident/fellow. I agree with the resident/fellow's medical  decision making as documented in the note.    Linden Bey MD         [1]   Current Outpatient Medications:     cimetidine (Tagamet) 200 mg tablet, Take 1 tablet (200 mg) by mouth if needed., Disp: , Rfl:     doxycycline (Vibra-Tabs) 100 mg tablet, if needed., Disp: , Rfl:     emtricitabine-tenofovir, TDF, (Truvada) 200-300 mg tablet, Take 1 tablet by mouth once daily., Disp: 90 tablet, Rfl: 3    esomeprazole (NexIUM) 40 mg DR capsule, Take 1 capsule (40 mg) by mouth once daily in the morning. Take before meals., Disp: 90 capsule, Rfl: 3    glucosamine-chondroitin 500-400 mg tablet, Take 1 tablet by mouth 2 times a day., Disp: , Rfl:     ketoconazole (NIZOral) 2 % cream, Apply topically 2 times a day. To affected areas of feet for 3-4 weeks; repeat as needed for flares, Disp: 60 g, Rfl: 11    ketoconazole (NIZOral) 2 % shampoo, Lather on to affected area. Let sit for 5 minutes. Rinse thoroughly. Use 2-3 times per week until symptoms controlled then as needed for maintenance., Disp: 120 mL, Rfl: 11    melatonin 12 mg tablet, Take by mouth if needed., Disp: , Rfl:     rosuvastatin (Crestor) 5 mg tablet, Take 1 tablet (5 mg) by mouth once daily at bedtime., Disp: 90 tablet, Rfl: 3    valACYclovir (Valtrex) 1 gram tablet, Take 1 tablet (1,000 mg) by mouth once daily., Disp: 90 tablet, Rfl: 3

## 2025-06-09 NOTE — Clinical Note
Scattered on the patient's face, scalp, and bilateral dorsal hands, there are multiple erythematous, gritty, scaly macules

## 2025-06-09 NOTE — Clinical Note
Clinically benign- to slightly atypical-appearing nevi - the clinically benign- to slightly atypical-appearing nature of the patient's nevi was discussed with the patient today.  None of the patient's nevi meet threshold for biopsy today.  We emphasized the importance of performing monthly self-skin exams using the ABCDs of monitoring moles, which were reviewed with the patient today and an informational hand-out provided.  We also emphasized the importance of sun avoidance and sun protection with daily sunscreen use.  The patient expressed understanding and is in agreement with this plan.

## 2025-06-09 NOTE — Clinical Note
On the patient's chest, scalp and face, mainly the glabella and bilateral eyebrows and perinasal creases, there are pink, scaly patches with whitish-yellowish, greasy scale

## 2025-06-09 NOTE — Clinical Note
Muhammad Angiomas - the benign nature of these vascular lesions was discussed with the patient today and reassurance provided.  No treatment is medically indicated for these lesions at this time.   flu-like symptoms

## 2025-06-09 NOTE — Clinical Note
Scattered on the patient's face, neck, trunk, and extremities, there are several tan- to light brown-colored, hyperkeratotic, stuck-on appearing papules of varying size and shape

## 2025-06-09 NOTE — Clinical Note
Seborrheic Dermatitis - flare on chest, scalp and face.  The potentially chronic and intermittently flaring nature of this condition and treatment options were discussed extensively with the patient today.  At this time, for his scalp, we recommend topical anti-fungal therapy with Ketoconazole 2% shampoo, which the patient was instructed to use 2-3 days per week, alternating with over-the-counter anti-dandruff shampoos, such as Head & Shoulders, Selsun Blue, and Neutrogena T-gel, every month.  In addition, for his face, we recommend topical anti-fungal therapy with Ketoconazole 2% cream, which the patient was instructed to apply twice daily to the affected areas of the face.  The risks, benefits, and side effects of these medications were discussed.  The patient expressed understanding and is in agreement with this plan.

## 2025-06-09 NOTE — Clinical Note
On the patient's right proximal dorsal hand, there is a well-healed scar with no evidence of recurrent growth on exam today.

## 2025-06-09 NOTE — Clinical Note
On the patient's bilateral feet, there is moist scaling with maceration and fissures in the lateral webspaces and erythematous, scaly, thin plaques in a moccasin-type distribution on the soles and heels.

## 2025-06-09 NOTE — Clinical Note
History of squamous cell carcinoma in situ and actinic keratoses and photodamage.  There is no evidence of recurrence at the site of the patient's previously treated SCC in situ on his right proximal dorsal hand on exam today.  The signs and symptoms of skin cancer were reviewed and the patient was advised to practice sun protection and sun avoidance, use daily sunscreen, and perform regular self skin exams.  We will have the patient return to our office in 4-6 months, pending the above biopsy result, for routine follow-up and skin exam, and the patient was instructed to call our office should the patient notice any new, changing, symptomatic, or otherwise concerning skin lesions before then.  The patient expressed understanding and is in agreement with this plan.

## 2025-06-09 NOTE — Clinical Note
Actinic Keratoses -scattered on face, scalp, and bilateral dorsal hands.  The pre-cancerous nature of these lesions and treatment options were discussed with the patient today.  At this time, we recommend treatment with liquid nitrogen cryotherapy.  The patient expressed understanding, is in agreement with this plan, and wishes to proceed with cryotherapy today.

## 2025-06-09 NOTE — Clinical Note
[facility] Call Center    Phone Message    May a detailed message be left on voicemail: yes     Reason for Call: Appointment Intake    Referring Provider Name: Isabella Dia MD  Diagnosis and/or Symptoms: Pregnancy, supervision for, high-risk, unspecified trimester [O09.90]  Type 2 diabetes mellitus without complication, without long-term current use of ...    Action Taken: Message routed to:  [facility] ([facility] Endo    Travel Screening: Not Applicable                                                                         Seborrheic Keratoses - the benign nature of these lesions was discussed with the patient today and reassurance provided.  No treatment is medically indicated for these lesions at this time.

## 2025-06-12 ENCOUNTER — HOSPITAL ENCOUNTER (OUTPATIENT)
Dept: RADIOLOGY | Facility: HOSPITAL | Age: 65
Discharge: HOME | End: 2025-06-12
Payer: MEDICARE

## 2025-06-12 DIAGNOSIS — K40.90 RIGHT INGUINAL HERNIA: ICD-10-CM

## 2025-06-12 DIAGNOSIS — K40.91 RECURRENT INGUINAL HERNIA WITHOUT OBSTRUCTION OR GANGRENE, UNSPECIFIED LATERALITY: ICD-10-CM

## 2025-06-12 PROCEDURE — 72192 CT PELVIS W/O DYE: CPT

## 2025-06-16 ENCOUNTER — APPOINTMENT (OUTPATIENT)
Dept: DERMATOLOGY | Facility: CLINIC | Age: 65
End: 2025-06-16
Payer: MEDICARE

## 2025-06-16 DIAGNOSIS — L57.8 DIFFUSE PHOTODAMAGE OF SKIN: ICD-10-CM

## 2025-06-16 DIAGNOSIS — L82.0 INFLAMED SEBORRHEIC KERATOSIS: ICD-10-CM

## 2025-06-16 DIAGNOSIS — D18.01 HEMANGIOMA OF SKIN: ICD-10-CM

## 2025-06-16 DIAGNOSIS — L81.4 LENTIGO: ICD-10-CM

## 2025-06-16 DIAGNOSIS — L57.0 ACTINIC KERATOSIS: Primary | ICD-10-CM

## 2025-06-16 DIAGNOSIS — L82.1 SEBORRHEIC KERATOSIS: ICD-10-CM

## 2025-06-16 PROCEDURE — 99213 OFFICE O/P EST LOW 20 MIN: CPT | Performed by: DERMATOLOGY

## 2025-06-16 PROCEDURE — 17000 DESTRUCT PREMALG LESION: CPT | Performed by: DERMATOLOGY

## 2025-06-16 PROCEDURE — 17110 DESTRUCTION B9 LES UP TO 14: CPT | Performed by: DERMATOLOGY

## 2025-06-16 PROCEDURE — 1159F MED LIST DOCD IN RCRD: CPT | Performed by: DERMATOLOGY

## 2025-06-16 NOTE — Clinical Note
On the patient's right distal dorsal hand, there is a pink, well-healed scar at the recent biopsy site with a surrounding rim of erythema, grittiness, and scale

## 2025-06-16 NOTE — Clinical Note
History of squamous cell carcinoma in situ and actinic keratoses and photodamage.  The patient was recently seen in our office for routine follow-up and skin exam on 6/9/25, at which time no evidence of recurrence was noted.  The signs and symptoms of skin cancer were reviewed and the patient was advised to practice sun protection and sun avoidance, use daily sunscreen, and perform regular self skin exams.  I will have the patient return to our office in 4-6 months from the date of his last visit for routine follow-up and skin exam, and the patient was instructed to call our office should the patient notice any new, changing, symptomatic, or otherwise concerning skin lesions before then.  The patient expressed understanding and is in agreement with this plan.

## 2025-06-16 NOTE — Clinical Note
Muhammad Angiomas - the benign nature of these vascular lesions was discussed with the patient today and reassurance provided.  No treatment is medically indicated for these lesions at this time.

## 2025-06-16 NOTE — Clinical Note
Scattered on the patient's face, neck, and bilateral upper extremities, there are several tan- to light brown-colored, hyperkeratotic, stuck-on appearing papules of varying size and shape

## 2025-06-16 NOTE — Clinical Note
Biopsy-proven Hypertrophic Actinic Keratosis -right distal dorsal hand, present on the deep and peripheral margins.  The pre-cancerous nature of this lesion, its presence on the margin(s), and treatment options were discussed with the patient today.  At this time, I recommend treatment with liquid nitrogen cryotherapy.  The patient expressed understanding, is in agreement with this plan, and wishes to proceed with cryotherapy today.

## 2025-06-16 NOTE — Clinical Note
Scattered on the patient's face, neck, and bilateral upper extremities, there are multiple small, round, cherry red- to purplish-colored, symmetric, uniform, vascular-appearing macules and papules

## 2025-06-16 NOTE — PROGRESS NOTES
Subjective     Dimitris Schaeffer is a 65 y.o. male who presents for the following: Discuss recent biopsy result and treatment options.  Biopsy of a suspicious lesion on his right distal dorsal hand performed at his last visit in our office on 6/9/25 revealed a hypertrophic actinic keratosis.    Today, the patient states the biopsy site healed well.  Since his last visit, he reports a raised, rough bump on his left forearm, which has been present for several months, has been itching, especially when it rubs on his sleeves.  It has not changed in any other way, including in size, shape, or color, and it does not hurt or bleed.  He denies any other new, changing, or concerning skin lesions since his last visit; no bleeding, itching, or burning lesions.      Review of Systems:  No other skin or systemic complaints other than what is documented elsewhere in the note.    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 Kelly on 3/15/24  - AKs  - seborrheic dermatitis  - no h/o melanoma    Family History:    No known family history of skin cancer    Social History:    The patient states he is originally from [place], but then worked as a  in the [employer] in [facility] and now lives in [place]; his sister is a patient in our office as well as was his partner, Gustavo, but he recently passed away    Allergies:  Patient has no known allergies.    Current Medications / CAM's:  Current Medications[1]     Objective   Well appearing patient in no apparent distress; mood and affect are within  normal limits.    A skin examination was performed including: Face, neck, and bilateral upper extremities. All findings within normal limits unless otherwise noted below.    Assessment/Plan   Skin Exam  1. ACTINIC KERATOSIS  Right Hand - Anterior  On the patient's right distal dorsal hand, there is a pink, well-healed scar at the recent biopsy site with a surrounding rim of erythema, grittiness, and scale  Biopsy-proven Hypertrophic Actinic Keratosis -right distal dorsal hand, present on the deep and peripheral margins.  The pre-cancerous nature of this lesion, its presence on the margin(s), and treatment options were discussed with the patient today.  At this time, I recommend treatment with liquid nitrogen cryotherapy.  The patient expressed understanding, is in agreement with this plan, and wishes to proceed with cryotherapy today.  Destr of lesion - Right Hand - Anterior  Complexity: simple    Destruction method: cryotherapy    Informed consent: discussed and consent obtained    Lesion destroyed using liquid nitrogen: Yes    Region frozen until ice ball extended beyond lesion: Yes    Cryotherapy cycles:  1  Outcome: patient tolerated procedure well with no complications    Post-procedure details: wound care instructions given    2. INFLAMED SEBORRHEIC KERATOSIS  Left Forearm - Anterior  On the patient's left proximal dorsal forearm, there is a 6 mm erythematous and light brown-colored, hyperkeratotic, stuck-on appearing papule with a surrounding rim of erythema  Inflamed Seborrheic Keratosis -left proximal dorsal forearm.  The benign nature of this lesion was discussed with the patient today and reassurance provided.  Given the history the patient provides of frequent irritation and associated symptoms as well as its inflamed appearance on exam today, I offered to treat this lesion with liquid nitrogen cryotherapy.  The patient expressed understanding, is in agreement with this plan, and wishes to proceed with  cryotherapy today.  Destr of lesion - Left Forearm - Anterior  Complexity: simple    Destruction method: cryotherapy    Informed consent: discussed and consent obtained    Lesion destroyed using liquid nitrogen: Yes    Cryotherapy cycles:  2  Outcome: patient tolerated procedure well with no complications    Post-procedure details: wound care instructions given    3. SEBORRHEIC KERATOSIS  Generalized  Scattered on the patient's face, neck, and bilateral upper extremities, there are several tan- to light brown-colored, hyperkeratotic, stuck-on appearing papules of varying size and shape  Seborrheic Keratoses - the benign nature of these lesions was discussed with the patient today and reassurance provided.  No treatment is medically indicated for the noninflamed SKs at this time.  4. HEMANGIOMA OF SKIN  Generalized  Scattered on the patient's face, neck, and bilateral upper extremities, there are multiple small, round, cherry red- to purplish-colored, symmetric, uniform, vascular-appearing macules and papules  Cherry Angiomas - the benign nature of these vascular lesions was discussed with the patient today and reassurance provided.  No treatment is medically indicated for these lesions at this time.  5. LENTIGO  Photodistributed  Multiple tan- to light brown-colored, round- to oval-shaped, symmetric and uniform-appearing macules and small patches consistent with lentigines scattered in sun-exposed areas.  Solar Lentigines and photodamage.  The clinically benign-appearing nature of these lesions and their relation to chronic sun exposure were discussed with the patient today and reassurance provided.  None of these lesions meet threshold for biopsy today, and thus no treatment is medically indicated for these lesions at this time.  The signs and symptoms of skin cancer were reviewed and the patient was advised to practice sun protection and sun avoidance, use daily sunscreen, and perform regular self skin exams.  The  patient was instructed to monitor these lesions for any changes, such as in size, shape, or color, or associated symptoms and to call our office to schedule a return visit for re-evaluation if any such changes or symptoms are noticed in the future.  The patient expressed understanding and is in agreement with this plan.  6. DIFFUSE PHOTODAMAGE OF SKIN  Generalized  Diffuse photodamage with actinic changes with telangiectasia and mottled pigmentation in sun-exposed areas.  History of squamous cell carcinoma in situ and actinic keratoses and photodamage.  The patient was recently seen in our office for routine follow-up and skin exam on 6/9/25, at which time no evidence of recurrence was noted.  The signs and symptoms of skin cancer were reviewed and the patient was advised to practice sun protection and sun avoidance, use daily sunscreen, and perform regular self skin exams.  I will have the patient return to our office in 4-6 months from the date of his last visit for routine follow-up and skin exam, and the patient was instructed to call our office should the patient notice any new, changing, symptomatic, or otherwise concerning skin lesions before then.  The patient expressed understanding and is in agreement with this plan.          [1]   Current Outpatient Medications:     cimetidine (Tagamet) 200 mg tablet, Take 1 tablet (200 mg) by mouth if needed., Disp: , Rfl:     doxycycline (Vibra-Tabs) 100 mg tablet, if needed., Disp: , Rfl:     emtricitabine-tenofovir, TDF, (Truvada) 200-300 mg tablet, Take 1 tablet by mouth once daily., Disp: 90 tablet, Rfl: 3    esomeprazole (NexIUM) 40 mg DR capsule, Take 1 capsule (40 mg) by mouth once daily in the morning. Take before meals., Disp: 90 capsule, Rfl: 3    glucosamine-chondroitin 500-400 mg tablet, Take 1 tablet by mouth 2 times a day., Disp: , Rfl:     ketoconazole (NIZOral) 2 % cream, Apply topically 2 times a day. To affected areas of feet for 3-4 weeks; repeat as  needed for flares, Disp: 60 g, Rfl: 11    ketoconazole (NIZOral) 2 % shampoo, Lather on to affected area. Let sit for 5 minutes. Rinse thoroughly. Use 2-3 times per week until symptoms controlled then as needed for maintenance., Disp: 120 mL, Rfl: 11    melatonin 12 mg tablet, Take by mouth if needed., Disp: , Rfl:     rosuvastatin (Crestor) 5 mg tablet, Take 1 tablet (5 mg) by mouth once daily at bedtime., Disp: 90 tablet, Rfl: 3    valACYclovir (Valtrex) 1 gram tablet, Take 1 tablet (1,000 mg) by mouth once daily., Disp: 90 tablet, Rfl: 3

## 2025-06-16 NOTE — Clinical Note
Inflamed Seborrheic Keratosis -left proximal dorsal forearm.  The benign nature of this lesion was discussed with the patient today and reassurance provided.  Given the history the patient provides of frequent irritation and associated symptoms as well as its inflamed appearance on exam today, I offered to treat this lesion with liquid nitrogen cryotherapy.  The patient expressed understanding, is in agreement with this plan, and wishes to proceed with cryotherapy today.

## 2025-06-16 NOTE — Clinical Note
On the patient's left proximal dorsal forearm, there is a 6 mm erythematous and light brown-colored, hyperkeratotic, stuck-on appearing papule with a surrounding rim of erythema

## 2025-06-30 ENCOUNTER — LAB (OUTPATIENT)
Dept: LAB | Facility: HOSPITAL | Age: 65
End: 2025-06-30
Payer: MEDICARE

## 2025-06-30 DIAGNOSIS — Z00.00 HEALTH MAINTENANCE EXAMINATION: ICD-10-CM

## 2025-06-30 LAB
ALBUMIN SERPL BCP-MCNC: 4.2 G/DL (ref 3.4–5)
ALP SERPL-CCNC: 63 U/L (ref 33–136)
ALT SERPL W P-5'-P-CCNC: 17 U/L (ref 10–52)
ANION GAP SERPL CALC-SCNC: 12 MMOL/L (ref 10–20)
AST SERPL W P-5'-P-CCNC: 17 U/L (ref 9–39)
BASOPHILS # BLD AUTO: 0.02 X10*3/UL (ref 0–0.1)
BASOPHILS NFR BLD AUTO: 0.3 %
BILIRUB SERPL-MCNC: 0.6 MG/DL (ref 0–1.2)
BUN SERPL-MCNC: 19 MG/DL (ref 6–23)
CALCIUM SERPL-MCNC: 9.5 MG/DL (ref 8.6–10.6)
CHLORIDE SERPL-SCNC: 103 MMOL/L (ref 98–107)
CHOLEST SERPL-MCNC: 167 MG/DL (ref 0–199)
CHOLESTEROL/HDL RATIO: 3
CO2 SERPL-SCNC: 30 MMOL/L (ref 21–32)
CREAT SERPL-MCNC: 1.1 MG/DL (ref 0.5–1.3)
CRP SERPL HS-MCNC: 0.2 MG/L
EGFRCR SERPLBLD CKD-EPI 2021: 74 ML/MIN/1.73M*2
EOSINOPHIL # BLD AUTO: 0.12 X10*3/UL (ref 0–0.7)
EOSINOPHIL NFR BLD AUTO: 2 %
ERYTHROCYTE [DISTWIDTH] IN BLOOD BY AUTOMATED COUNT: 14 % (ref 11.5–14.5)
GLUCOSE SERPL-MCNC: 99 MG/DL (ref 74–99)
HCT VFR BLD AUTO: 47.5 % (ref 41–52)
HDLC SERPL-MCNC: 56.3 MG/DL
HGB BLD-MCNC: 15.4 G/DL (ref 13.5–17.5)
IMM GRANULOCYTES # BLD AUTO: 0.02 X10*3/UL (ref 0–0.7)
IMM GRANULOCYTES NFR BLD AUTO: 0.3 % (ref 0–0.9)
LDLC SERPL CALC-MCNC: 83 MG/DL
LYMPHOCYTES # BLD AUTO: 1.25 X10*3/UL (ref 1.2–4.8)
LYMPHOCYTES NFR BLD AUTO: 20.7 %
MCH RBC QN AUTO: 32.8 PG (ref 26–34)
MCHC RBC AUTO-ENTMCNC: 32.4 G/DL (ref 32–36)
MCV RBC AUTO: 101 FL (ref 80–100)
MONOCYTES # BLD AUTO: 0.54 X10*3/UL (ref 0.1–1)
MONOCYTES NFR BLD AUTO: 9 %
NEUTROPHILS # BLD AUTO: 4.08 X10*3/UL (ref 1.2–7.7)
NEUTROPHILS NFR BLD AUTO: 67.7 %
NON HDL CHOLESTEROL: 111 MG/DL (ref 0–149)
NRBC BLD-RTO: 0 /100 WBCS (ref 0–0)
PLATELET # BLD AUTO: 264 X10*3/UL (ref 150–450)
POTASSIUM SERPL-SCNC: 4.7 MMOL/L (ref 3.5–5.3)
PROT SERPL-MCNC: 6.7 G/DL (ref 6.4–8.2)
RBC # BLD AUTO: 4.7 X10*6/UL (ref 4.5–5.9)
SODIUM SERPL-SCNC: 140 MMOL/L (ref 136–145)
TRIGL SERPL-MCNC: 140 MG/DL (ref 0–149)
VLDL: 28 MG/DL (ref 0–40)
WBC # BLD AUTO: 6 X10*3/UL (ref 4.4–11.3)

## 2025-06-30 PROCEDURE — 80061 LIPID PANEL: CPT

## 2025-06-30 PROCEDURE — 85025 COMPLETE CBC W/AUTO DIFF WBC: CPT

## 2025-06-30 PROCEDURE — 83036 HEMOGLOBIN GLYCOSYLATED A1C: CPT

## 2025-06-30 PROCEDURE — 84443 ASSAY THYROID STIM HORMONE: CPT

## 2025-06-30 PROCEDURE — 82306 VITAMIN D 25 HYDROXY: CPT

## 2025-06-30 PROCEDURE — 84153 ASSAY OF PSA TOTAL: CPT

## 2025-06-30 PROCEDURE — 80053 COMPREHEN METABOLIC PANEL: CPT

## 2025-06-30 PROCEDURE — 86141 C-REACTIVE PROTEIN HS: CPT

## 2025-06-30 PROCEDURE — 81001 URINALYSIS AUTO W/SCOPE: CPT

## 2025-07-01 LAB
25(OH)D3 SERPL-MCNC: 67 NG/ML (ref 30–100)
APPEARANCE UR: CLEAR
BILIRUB UR STRIP.AUTO-MCNC: NEGATIVE MG/DL
COLOR UR: YELLOW
EST. AVERAGE GLUCOSE BLD GHB EST-MCNC: 114 MG/DL
GLUCOSE UR STRIP.AUTO-MCNC: NORMAL MG/DL
HBA1C MFR BLD: 5.6 % (ref ?–5.7)
HOLD SPECIMEN: NORMAL
KETONES UR STRIP.AUTO-MCNC: NEGATIVE MG/DL
LEUKOCYTE ESTERASE UR QL STRIP.AUTO: NEGATIVE
MUCOUS THREADS #/AREA URNS AUTO: NORMAL /LPF
NITRITE UR QL STRIP.AUTO: NEGATIVE
PH UR STRIP.AUTO: 5.5 [PH]
PROT UR STRIP.AUTO-MCNC: ABNORMAL MG/DL
PSA SERPL-MCNC: 0.82 NG/ML
RBC # UR STRIP.AUTO: NEGATIVE MG/DL
RBC #/AREA URNS AUTO: NORMAL /HPF
SP GR UR STRIP.AUTO: 1.04
TSH SERPL-ACNC: 0.93 MIU/L (ref 0.44–3.98)
UROBILINOGEN UR STRIP.AUTO-MCNC: NORMAL MG/DL
WBC #/AREA URNS AUTO: NORMAL /HPF

## 2025-07-09 NOTE — PROGRESS NOTES
"Physical Exam    Name Dimitris Schaeffer    Date of Service :7/10/2025      Dimitris Schaeffer is a 65 y.o. year old male who is being seen for a comprehensive exam    GERD- EGD done . Unremarkable 23  MOHS surgery SCC 3/15/24  right dorsal hand   Bilateral inguinal hernia repair   with recurrence on right side scheduled for repair 25   Right knee pain /tendonitis  -  24    referral to PT   till Mid July. Still with some pain. Unable to walk as much as he would like to.   Mild prediabetes       His main health concerns today:      Had some increased emotional distress this past few months   His sister has had some health issues requiring him to be in the hospital more which has triggered many emotions  He has found a new therapist  Was having difficulty eating and lost some weight, also having some difficulty sleeping with waking up with preservations   Now Eating more  and Able to sleep better   Interested in potentially exploring medication to help  Exercising still      Problem List[1]     Medical History[2]     Surgical History[3]     PSHx  Appendectomy late 20\"s  and redo from peritonitis   Inguinal hernia bilateral repair 2019   Oral surgery     Social History[4]   Social History     Social History Narrative    Retired, [employer],  from his partner of 40 years.     Smoker - never     exercise- walk daily     diet- balanced. likes to cook. allergic to blueberries, linginberry, cranberry    hobby is 'setting type\"      Alcohol: occ /social   Tobacco: none   Diet: balanced   Exercise:   walking daily     Family History[5]     Family History   Dad-   of COPD age 80, first MI age 61, second 65.   Mom-  mid 80's  chronic depressions  Mgf-  of kidney disease at 40  Mgm- lived to age 98.   Later dementia   Pgf-  in his late 30's  of a stroke.   Pgm-lived to be 100  1 brother -  alive age 78  Parkinson's disease diagnosed age 76.  Lives in [place]   1 sister- alive lives locally.  11 " "years older.  She is healthy  Breast cancer diagnoesd 8 years ago.  Developed essential tremor , elvated cholesterol   Much older >10 years brother is 14 years older ,          Current Medications[6]    Allergies[7]    Depression Screen  (Note: if answer to either of the following is \"Yes\", then a more complete depression screening is indicated)   Q1: Over the past two weeks, have you felt down, depressed or hopeless? Yes  Q2: Over the past two weeks, have you felt little interest or pleasure in doing things? No      ROUTINE:     Immunizations pneumonia and    second monkey pox?   PSA 0.82     COLONOSCOPY:7/31/23 repeat 5 years secondary to poor prep       OPHTHALMOLOGY: current   DERMATOLOGY-  6/16/25  Dr. Bey   MOHS surgery SCC 3/15/24 right dorsal hand       Review of Systems   Genitourinary:  Positive for frequency and urgency. Negative for difficulty urinating.        /83 (BP Location: Left arm, Patient Position: Sitting)   Pulse 77   Temp 37.1 °C (98.8 °F)   Ht 1.664 m (5' 5.5\")   Wt 63 kg (139 lb)   SpO2 97%   BMI 22.78 kg/m²  Body mass index is 22.78 kg/m².    Physical Exam  Vitals reviewed.   Constitutional:       General: He is not in acute distress.     Appearance: Normal appearance.   HENT:      Right Ear: Tympanic membrane and external ear normal.      Left Ear: Tympanic membrane and external ear normal.      Ears:      Comments: Hearing intact bilaterally   Heavy wax build up in left ear   Eyes:      Extraocular Movements: Extraocular movements intact.      Conjunctiva/sclera: Conjunctivae normal.   Neck:      Thyroid: No thyroid mass, thyromegaly or thyroid tenderness.      Vascular: No carotid bruit.   Cardiovascular:      Rate and Rhythm: Normal rate and regular rhythm.      Pulses: Normal pulses.      Heart sounds: Normal heart sounds. No murmur heard.     No gallop.   Pulmonary:      Effort: Pulmonary effort is normal. No respiratory distress.      Breath sounds: Normal breath " sounds. No wheezing, rhonchi or rales.   Abdominal:      General: Bowel sounds are normal. There is no distension.      Palpations: There is no mass.      Tenderness: There is no abdominal tenderness. There is no guarding. Negative signs include psoas sign.   Musculoskeletal:         General: No swelling or tenderness. Normal range of motion.      Cervical back: Neck supple.      Right lower leg: No edema.      Left lower leg: No edema.   Lymphadenopathy:      Head:      Right side of head: No submandibular adenopathy.      Left side of head: No submandibular adenopathy.      Cervical: No cervical adenopathy.      Upper Body:      Right upper body: No supraclavicular adenopathy.      Left upper body: No supraclavicular adenopathy.      Lower Body: No right inguinal adenopathy. No left inguinal adenopathy.   Skin:     General: Skin is warm and dry.      Findings: No rash.   Neurological:      General: No focal deficit present.      Mental Status: He is alert.      Motor: Motor function is intact.      Coordination: Coordination is intact.      Gait: Gait normal.   Psychiatric:         Mood and Affect: Mood normal.         Speech: Speech normal.         Thought Content: Thought content normal.         RESULTS/DATA:  Reviewed Standard Labs for this physical with patient ( any significant issues addressed in A/P )     ECG: . NSR, nonspecific qrs morphology in lead 2 and avf, of questionable clinical significance.     Component      Latest Ref [facility] 6/30/2025   WBC      4.4 - 11.3 x10*3/uL 6.0    nRBC      0.0 - 0.0 /100 WBCs 0.0    RBC      4.50 - 5.90 x10*6/uL 4.70    HEMOGLOBIN      13.5 - 17.5 g/dL 15.4    HEMATOCRIT      41.0 - 52.0 % 47.5    MCV      80 - 100 fL 101 (H)    MCH      26.0 - 34.0 pg 32.8    MCHC      32.0 - 36.0 g/dL 32.4    RED CELL DISTRIBUTION WIDTH      11.5 - 14.5 % 14.0    Platelets      150 - 450 x10*3/uL 264    Neutrophils %      40.0 - 80.0 % 67.7    Immature Granulocytes %, Automated      0.0  - 0.9 % 0.3    Lymphocytes %      13.0 - 44.0 % 20.7    Monocytes %      2.0 - 10.0 % 9.0    Eosinophils %      0.0 - 6.0 % 2.0    Basophils %      0.0 - 2.0 % 0.3    Neutrophils Absolute      1.20 - 7.70 x10*3/uL 4.08    Immature Granulocytes Absolute, Automated      0.00 - 0.70 x10*3/uL 0.02    Lymphocytes Absolute      1.20 - 4.80 x10*3/uL 1.25    Monocytes Absolute      0.10 - 1.00 x10*3/uL 0.54    Eosinophils Absolute      0.00 - 0.70 x10*3/uL 0.12    Basophils Absolute      0.00 - 0.10 x10*3/uL 0.02    GLUCOSE      74 - 99 mg/dL 99    SODIUM      136 - 145 mmol/L 140    POTASSIUM      3.5 - 5.3 mmol/L 4.7    CHLORIDE      98 - 107 mmol/L 103    Bicarbonate      21 - 32 mmol/L 30    Anion Gap      10 - 20 mmol/L 12    Blood Urea Nitrogen      6 - 23 mg/dL 19    Creatinine      0.50 - 1.30 mg/dL 1.10    EGFR      >60 mL/min/1.73m*2 74    Calcium      8.6 - 10.6 mg/dL 9.5    Albumin      3.4 - 5.0 g/dL 4.2    Alkaline Phosphatase      33 - 136 U/L 63    Total Protein      6.4 - 8.2 g/dL 6.7    AST      9 - 39 U/L 17    Bilirubin Total      0.0 - 1.2 mg/dL 0.6    ALT      10 - 52 U/L 17    Color, Urine      Light-Yellow, Yellow, Dark-Yellow  Yellow    Appearance, Urine      Clear  Clear    Specific Gravity, Urine      1.005 - 1.035  1.039 ! (N)    pH, Urine      5.0, 5.5, 6.0, 6.5, 7.0, 7.5, 8.0  5.5    Protein, Urine      NEGATIVE, 10 (TRACE), 20 (TRACE) mg/dL 10 (TRACE)    Glucose, Urine      Normal mg/dL Normal    Blood, Urine      NEGATIVE mg/dL NEGATIVE    Ketones, Urine      NEGATIVE mg/dL NEGATIVE    Bilirubin, Urine      NEGATIVE mg/dL NEGATIVE    Urobilinogen, Urine      Normal mg/dL Normal    Nitrite, Urine      NEGATIVE  NEGATIVE    Leukocyte Esterase, Urine      NEGATIVE  NEGATIVE    CHOLESTEROL      0 - 199 mg/dL 167    HDL CHOLESTEROL      mg/dL 56.3    Cholesterol/HDL Ratio 3.0    LDL Calculated      <=99 mg/dL 83    VLDL      0 - 40 mg/dL 28    TRIGLYCERIDES      0 - 149 mg/dL 140    Non HDL  Cholesterol      0 - 149 mg/dL 111    WBC, Urine      1-5, NONE /HPF NONE    RBC, Urine      NONE, 1-2, 3-5 /HPF 1-2    Mucus, Urine      Reference range not established. /LPF 1+    Hemoglobin A1C      See comment % 5.6    Estimated Average Glucose      Not Established mg/dL 114    Thyroid Stimulating Hormone      0.44 - 3.98 mIU/L 0.93    CRP, High Sensitivity      <1.0 mg/L 0.2    Vitamin D, 25-Hydroxy, Total      30 - 100 ng/mL 67    PSA      <=4.00 ng/mL 0.82       Legend:  (H) High  ! (N) Normal  Assessment/Plan     Routine   You should still get your second monkey pox vaccine  You are due to get your second pneumonia vaccine.  I would recommend the Prevnar 21     At your convenience forward a copy of your health care power of  and living will so we can scan to your chart      For the nonspecific changes on your EKG will order an echo    We discussed starting zoloft.  You may start 1/2 to 1 tablet daily ( night or am does not matter).  Please touch base with me in a few weeks with an update and 3 months in the office     Use ear softening drops and return for irrigation of your left ear     Edilma Mason MD                    [1]   Patient Active Problem List  Diagnosis    Hyperlipidemia    Chronic GERD    Annual physical exam    Pes anserinus tendinitis and bursitis    Pes anserinus bursitis of right knee    Right knee pain    Recurrent inguinal hernia without obstruction or gangrene   [2]   Past Medical History:  Diagnosis Date    Herpes infection    [3]   Past Surgical History:  Procedure Laterality Date    ADENOIDECTOMY      HERNIA REPAIR      Inguinal    TONSILLECTOMY     [4]   Social History  Tobacco Use    Smoking status: Never    Smokeless tobacco: Never   Vaping Use    Vaping status: Never Used   Substance Use Topics    Alcohol use: Yes     Alcohol/week: 7.0 standard drinks of alcohol     Types: 7 Glasses of wine per week    Drug use: Never   [5] No family history on file.  [6]   Current  Outpatient Medications:     cimetidine (Tagamet) 200 mg tablet, Take 1 tablet (200 mg) by mouth if needed., Disp: , Rfl:     doxycycline (Vibra-Tabs) 100 mg tablet, if needed., Disp: , Rfl:     emtricitabine-tenofovir, TDF, (Truvada) 200-300 mg tablet, Take 1 tablet by mouth once daily., Disp: 90 tablet, Rfl: 3    esomeprazole (NexIUM) 40 mg DR capsule, Take 1 capsule (40 mg) by mouth once daily in the morning. Take before meals., Disp: 90 capsule, Rfl: 3    glucosamine-chondroitin 500-400 mg tablet, Take 1 tablet by mouth 2 times a day., Disp: , Rfl:     ketoconazole (NIZOral) 2 % cream, Apply topically 2 times a day. To affected areas of feet for 3-4 weeks; repeat as needed for flares, Disp: 60 g, Rfl: 11    ketoconazole (NIZOral) 2 % shampoo, Lather on to affected area. Let sit for 5 minutes. Rinse thoroughly. Use 2-3 times per week until symptoms controlled then as needed for maintenance., Disp: 120 mL, Rfl: 11    melatonin 12 mg tablet, Take by mouth if needed., Disp: , Rfl:     rosuvastatin (Crestor) 5 mg tablet, Take 1 tablet (5 mg) by mouth once daily at bedtime., Disp: 90 tablet, Rfl: 3    valACYclovir (Valtrex) 1 gram tablet, Take 1 tablet (1,000 mg) by mouth once daily., Disp: 90 tablet, Rfl: 3    sertraline (Zoloft) 50 mg tablet, Take 1 tablet (50 mg) by mouth once daily., Disp: 30 tablet, Rfl: 0  [7] No Known Allergies

## 2025-07-10 ENCOUNTER — APPOINTMENT (OUTPATIENT)
Dept: PRIMARY CARE | Facility: CLINIC | Age: 65
End: 2025-07-10
Payer: COMMERCIAL

## 2025-07-10 VITALS
WEIGHT: 139 LBS | HEART RATE: 77 BPM | BODY MASS INDEX: 22.34 KG/M2 | HEIGHT: 66 IN | SYSTOLIC BLOOD PRESSURE: 131 MMHG | DIASTOLIC BLOOD PRESSURE: 83 MMHG | TEMPERATURE: 98.8 F | OXYGEN SATURATION: 97 %

## 2025-07-10 DIAGNOSIS — R94.31 ABNORMAL ECG: ICD-10-CM

## 2025-07-10 DIAGNOSIS — Z00.00 ANNUAL PHYSICAL EXAM: Primary | ICD-10-CM

## 2025-07-10 DIAGNOSIS — F41.9 ANXIETY: ICD-10-CM

## 2025-07-10 RX ORDER — SERTRALINE HYDROCHLORIDE 50 MG/1
50 TABLET, FILM COATED ORAL DAILY
Qty: 30 TABLET | Refills: 0 | Status: SHIPPED | OUTPATIENT
Start: 2025-07-10

## 2025-07-10 ASSESSMENT — ENCOUNTER SYMPTOMS
FREQUENCY: 1
DIFFICULTY URINATING: 0

## 2025-07-10 NOTE — PATIENT INSTRUCTIONS
You should still get your second monkey pox vaccine  You are due to get your second pneumonia vaccine.  I would recommend the Prevnar 21     At your convenience forward a copy of your health care power of  and living will so we can scan to your chart      For the nonspecific changes on your EKG will order an echol     We discussed starting zoloft.  You may start 1/2 to 1 tablet daily ( night or am does not matter).  Please touch base with me in a few weeks with an update and 3 months in the office

## 2025-07-21 ENCOUNTER — TELEPHONE (OUTPATIENT)
Dept: PRIMARY CARE | Facility: CLINIC | Age: 65
End: 2025-07-21
Payer: MEDICARE

## 2025-07-21 DIAGNOSIS — Z72.52 HIGH RISK HOMOSEXUAL BEHAVIOR: ICD-10-CM

## 2025-07-21 DIAGNOSIS — Z72.52 HIGH RISK HOMOSEXUAL BEHAVIOR: Primary | ICD-10-CM

## 2025-07-21 RX ORDER — DOXYCYCLINE HYCLATE 100 MG
200 TABLET ORAL AS NEEDED
Qty: 30 TABLET | Refills: 0 | Status: SHIPPED | OUTPATIENT
Start: 2025-07-21 | End: 2025-07-21 | Stop reason: SDUPTHER

## 2025-07-21 RX ORDER — DOXYCYCLINE HYCLATE 100 MG
200 TABLET ORAL AS NEEDED
Qty: 30 TABLET | Refills: 0 | Status: SHIPPED | OUTPATIENT
Start: 2025-07-21 | End: 2025-08-20

## 2025-07-21 RX ORDER — DOXYCYCLINE HYCLATE 100 MG
100 TABLET ORAL AS NEEDED
Qty: 30 TABLET | Refills: 0 | Status: SHIPPED | OUTPATIENT
Start: 2025-07-21 | End: 2025-07-21 | Stop reason: DRUGHIGH

## 2025-07-21 NOTE — TELEPHONE ENCOUNTER
PRABHJOT  See email below from patient-      Could you also let Dr Mason know that, for now at least, I haven't started the antidepressant, a decision I reached after another good session with my therapist and a week of continued improvement in my mood. I'm leaving on a short vacation Thursday and will give a call when I'm back.      Thanks!  Willy

## 2025-07-23 ENCOUNTER — ANCILLARY PROCEDURE (OUTPATIENT)
Dept: CARDIOLOGY | Facility: CLINIC | Age: 65
End: 2025-07-23
Payer: MEDICARE

## 2025-07-23 DIAGNOSIS — R94.31 ABNORMAL ECG: ICD-10-CM

## 2025-07-23 LAB
AORTIC VALVE MEAN GRADIENT: 4 MMHG
AORTIC VALVE PEAK VELOCITY: 1.39 M/S
AV PEAK GRADIENT: 8 MMHG
AVA (PEAK VEL): 2.44 CM2
AVA (VTI): 2.53 CM2
EJECTION FRACTION APICAL 4 CHAMBER: 63
EJECTION FRACTION: 61 %
LEFT ATRIUM VOLUME AREA LENGTH INDEX BSA: 27.9 ML/M2
LEFT VENTRICLE INTERNAL DIMENSION DIASTOLE: 4.62 CM (ref 3.5–6)
LEFT VENTRICULAR OUTFLOW TRACT DIAMETER: 2.04 CM
MITRAL VALVE E/A RATIO: 1.11
RIGHT VENTRICLE FREE WALL PEAK S': 13 CM/S
RIGHT VENTRICLE PEAK SYSTOLIC PRESSURE: 21 MMHG
TRICUSPID ANNULAR PLANE SYSTOLIC EXCURSION: 2.1 CM

## 2025-07-23 PROCEDURE — 93306 TTE W/DOPPLER COMPLETE: CPT

## 2025-07-23 PROCEDURE — 93306 TTE W/DOPPLER COMPLETE: CPT | Performed by: INTERNAL MEDICINE

## 2025-08-04 DIAGNOSIS — Z72.52 HIGH RISK HOMOSEXUAL BEHAVIOR: Primary | ICD-10-CM

## 2025-08-13 DIAGNOSIS — B00.9 HERPES SIMPLEX: ICD-10-CM

## 2025-08-13 DIAGNOSIS — E78.00 HYPERCHOLESTEREMIA: ICD-10-CM

## 2025-08-13 RX ORDER — ROSUVASTATIN CALCIUM 5 MG/1
5 TABLET, COATED ORAL NIGHTLY
Qty: 90 TABLET | Refills: 3 | Status: SHIPPED | OUTPATIENT
Start: 2025-08-13 | End: 2026-08-13

## 2025-08-13 RX ORDER — VALACYCLOVIR HYDROCHLORIDE 1 G/1
1000 TABLET, FILM COATED ORAL DAILY
Qty: 90 TABLET | Refills: 3 | Status: SHIPPED | OUTPATIENT
Start: 2025-08-13

## 2025-08-18 ENCOUNTER — TELEPHONE (OUTPATIENT)
Dept: PRIMARY CARE | Facility: CLINIC | Age: 65
End: 2025-08-18
Payer: MEDICARE

## 2025-08-18 DIAGNOSIS — F45.8 BRUXISM: Primary | ICD-10-CM

## 2025-08-18 DIAGNOSIS — F41.9 ANXIETY: ICD-10-CM

## 2025-08-18 RX ORDER — SERTRALINE HYDROCHLORIDE 50 MG/1
50 TABLET, FILM COATED ORAL DAILY
Qty: 30 TABLET | Refills: 0 | Status: SHIPPED | OUTPATIENT
Start: 2025-08-18

## 2025-08-18 RX ORDER — BUSPIRONE HYDROCHLORIDE 5 MG/1
5 TABLET ORAL 2 TIMES DAILY
Qty: 60 TABLET | Refills: 0 | Status: SHIPPED | OUTPATIENT
Start: 2025-08-18 | End: 2026-08-18

## 2025-08-27 DIAGNOSIS — F45.8 BRUXISM: Primary | ICD-10-CM

## 2025-08-27 DIAGNOSIS — F41.9 ANXIETY: ICD-10-CM

## 2025-08-27 RX ORDER — BUSPIRONE HYDROCHLORIDE 10 MG/1
10 TABLET ORAL 2 TIMES DAILY
Qty: 180 TABLET | Refills: 0 | Status: SHIPPED | OUTPATIENT
Start: 2025-08-27 | End: 2025-11-25

## 2025-10-06 ENCOUNTER — APPOINTMENT (OUTPATIENT)
Dept: DERMATOLOGY | Facility: CLINIC | Age: 65
End: 2025-10-06
Payer: COMMERCIAL

## 2025-10-08 ENCOUNTER — APPOINTMENT (OUTPATIENT)
Dept: PRIMARY CARE | Facility: CLINIC | Age: 65
End: 2025-10-08
Payer: COMMERCIAL

## 2025-12-15 ENCOUNTER — APPOINTMENT (OUTPATIENT)
Dept: DERMATOLOGY | Facility: CLINIC | Age: 65
End: 2025-12-15
Payer: MEDICARE